# Patient Record
Sex: FEMALE | URBAN - METROPOLITAN AREA
[De-identification: names, ages, dates, MRNs, and addresses within clinical notes are randomized per-mention and may not be internally consistent; named-entity substitution may affect disease eponyms.]

---

## 2018-07-01 ENCOUNTER — OUTPATIENT (OUTPATIENT)
Dept: OUTPATIENT SERVICES | Facility: HOSPITAL | Age: 33
LOS: 1 days | End: 2018-07-01
Payer: MEDICAID

## 2018-07-21 ENCOUNTER — EMERGENCY (EMERGENCY)
Facility: HOSPITAL | Age: 33
LOS: 1 days | Discharge: HOME | End: 2018-07-21
Attending: EMERGENCY MEDICINE
Payer: SELF-PAY

## 2018-07-21 VITALS
WEIGHT: 164.91 LBS | HEART RATE: 81 BPM | HEIGHT: 62 IN | SYSTOLIC BLOOD PRESSURE: 120 MMHG | OXYGEN SATURATION: 100 % | DIASTOLIC BLOOD PRESSURE: 78 MMHG | RESPIRATION RATE: 16 BRPM | TEMPERATURE: 98 F

## 2018-07-21 DIAGNOSIS — Z90.89 ACQUIRED ABSENCE OF OTHER ORGANS: Chronic | ICD-10-CM

## 2018-07-21 PROCEDURE — 99284 EMERGENCY DEPT VISIT MOD MDM: CPT

## 2018-07-21 PROCEDURE — 99283 EMERGENCY DEPT VISIT LOW MDM: CPT

## 2018-07-21 RX ORDER — METHOCARBAMOL 500 MG/1
2 TABLET, FILM COATED ORAL
Qty: 28 | Refills: 0 | OUTPATIENT
Start: 2018-07-21 | End: 2018-07-27
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
## 2018-07-26 DIAGNOSIS — Z71.89 OTHER SPECIFIED COUNSELING: ICD-10-CM

## 2018-09-01 PROCEDURE — G9001: CPT

## 2021-08-20 ENCOUNTER — OFFICE VISIT (OUTPATIENT)
Dept: URGENT CARE | Facility: MEDICAL CENTER | Age: 36
End: 2021-08-20
Payer: COMMERCIAL

## 2021-08-20 VITALS
TEMPERATURE: 99.8 F | SYSTOLIC BLOOD PRESSURE: 130 MMHG | OXYGEN SATURATION: 98 % | HEART RATE: 87 BPM | DIASTOLIC BLOOD PRESSURE: 90 MMHG | RESPIRATION RATE: 20 BRPM

## 2021-08-20 DIAGNOSIS — H57.11 ACUTE RIGHT EYE PAIN: Primary | ICD-10-CM

## 2021-08-20 DIAGNOSIS — S05.01XA ABRASION OF RIGHT CORNEA, INITIAL ENCOUNTER: ICD-10-CM

## 2021-08-20 PROCEDURE — 99203 OFFICE O/P NEW LOW 30 MIN: CPT | Performed by: PHYSICIAN ASSISTANT

## 2021-08-20 RX ORDER — GENTAMICIN SULFATE 3 MG/ML
1 SOLUTION/ DROPS OPHTHALMIC 4 TIMES DAILY
Qty: 1.4 ML | Refills: 0 | Status: SHIPPED | OUTPATIENT
Start: 2021-08-20 | End: 2021-08-27

## 2021-08-20 RX ORDER — TETRACAINE HYDROCHLORIDE 5 MG/ML
1 SOLUTION OPHTHALMIC ONCE
Status: COMPLETED | OUTPATIENT
Start: 2021-08-20 | End: 2021-08-20

## 2021-08-20 RX ADMIN — TETRACAINE HYDROCHLORIDE 1 DROP: 5 SOLUTION OPHTHALMIC at 20:02

## 2021-08-20 NOTE — PROGRESS NOTES
[facility]        NAME: Jessica Lowery is a 29 y o  female  : 1985    MRN: [id number]  DATE: 2021  TIME: 7:56 PM    Assessment and Plan   Acute right eye pain [H57 11]  1  Acute right eye pain  tetracaine 0 5 % ophthalmic solution 1 drop    fluorescein sodium sterile 1 mg ophthalmic strip 1 strip   2  Abrasion of right cornea, initial encounter       Patient was educated on checking for right corneal abrasion  Patient consented to procedure  Patients right eye was numbed with one drop of tetracaine  Right eye was then stained and with UV light and corneal abrasion was identified on right eye  Patient understood  Patient tolerated procedure well without any complication  Patient Instructions       Patient was educated on right corneal abrasion  Patient was educated on antibiotics drops prescribed  Patient was told if redness or pressure in right eye persist follow up with eye doctor  Patient understood  Chief Complaint     Chief Complaint   Patient presents with   [facility] Problem     Patient presents with r eye redness, blurred vision, and pressure in her R eye when she closes it  She has been having this issue since the  intermittently  R eye is red          History of Present Illness       Patient is here today complaining of right eye pain that started 2021  Admits blurred vision and pressure in right eye  No history of right eye trauma  No itchness and or discharge from right eye  Review of Systems   Review of Systems   Constitutional: Negative  Eyes: Positive for pain and redness  Negative for discharge and itching  Respiratory: Negative  Cardiovascular: Negative  Psychiatric/Behavioral: Negative  Current Medications     No current outpatient medications on file      Current Facility-Administered Medications:     fluorescein sodium sterile 1 mg ophthalmic strip 1 strip, 1 strip, Right Eye, Once, Corwin Choi PA-C   tetracaine 0 5 % ophthalmic solution 1 drop, 1 drop, Right Eye, Once, Corwin Choi PA-C    Current Allergies     Allergies as of 08/20/2021    (No Known Allergies)            The following portions of the patient's history were reviewed and updated as appropriate: allergies, current medications, past family history, past medical history, past social history, past surgical history and problem list      History reviewed  No pertinent past medical history  History reviewed  No pertinent surgical history  History reviewed  No pertinent family history  Medications have been verified  Objective   /90   Pulse 87   Temp 99 8 °F (37 7 °C)   Resp 20   LMP  (LMP Unknown)   SpO2 98%   No LMP recorded (lmp unknown)  Physical Exam     Physical Exam  Constitutional:       Appearance: She is normal weight  HENT:      Head: Normocephalic  Eyes:      General:         Right eye: No discharge  Left eye: No discharge  Extraocular Movements: Extraocular movements intact  Pupils: Pupils are equal, round, and reactive to light  Comments: Right eye is injected  Cardiovascular:      Rate and Rhythm: Normal rate and regular rhythm  Heart sounds: Normal heart sounds  Pulmonary:      Breath sounds: Normal breath sounds  Neurological:      General: No focal deficit present  Mental Status: She is alert and oriented to person, place, and time     Psychiatric:         Behavior: Behavior normal

## 2021-08-20 NOTE — PATIENT INSTRUCTIONS
Patient was educated on right corneal abrasion  Patient was educated on antibiotics drops prescribed  Patient was told if redness or pressure in right eye persist follow up with eye doctor  Patient understood  Corneal Abrasion   WHAT YOU NEED TO KNOW:   A corneal abrasion is a scratch on the cornea of your eye  The cornea is the clear layer that covers the front of your eye  A small scratch may heal in 1 to 2 days  Deeper or larger scratches may take longer to heal       DISCHARGE INSTRUCTIONS:   Call your healthcare provider or ophthalmologist if:   · Your eye pain or vision gets worse  · You have yellow or green drainage from your eye  · You have questions or concerns about your condition or care  Medicines:   · Medicines  may be given in the form of eyedrops or ointment to help prevent an eye infection  You may also be given eyedrops to decrease pain  · Take your medicine as directed  Contact your healthcare provider if you think your medicine is not helping or if you have side effects  Tell him or her if you are allergic to any medicine  Keep a list of the medicines, vitamins, and herbs you take  Include the amounts, and when and why you take them  Bring the list or the pill bottles to follow-up visits  Carry your medicine list with you in case of an emergency  Care for your eyes:   · Get regular eye exams  Get your eyes checked at least every year  · Eat healthy foods  Fresh fruits and vegetables that are rich in vitamins A and C may help with your vision  Foods such as sweet potatoes, apricots, and carrots are rich in good nutrients for the eyes  · Take care of your contacts or glasses  Store, clean, and use your contacts or glasses as directed  Replace your glasses or contact lenses as often as your healthcare provider suggests  · Decrease eye strain  Rest your eyes, especially after you read or sew for long periods of time  Get plenty of sleep at night   Use lights that reduce glare in your home, school, or workplace  · Wear dark sunglasses  This will help prevent pain and light sensitivity  Make sure the sunglasses have UVA and UVB protection  This will protect your eyes when you go outside  · Use eyedrops safely  If your treatment plan includes eyedrops, it is important to use them as directed  Your provider may give you detailed instructions to follow  The eyedrops may also come with safety instructions  Follow all instructions to help prevent an infection  Do not touch the tip of the bottle to your eye  Germs from your eye can spread to the medicine bottle  Help prevent corneal abrasions:   · Remove your contact lenses if your eyes feel dry or irritated  · Wash your hands if you need to touch your eyes or your face  · Trim your child's fingernails so he or she cannot scratch his or her eye  · Wear protective eyewear when you work with chemicals, wood, dust, or metal     · Wear protective eyewear when you play sports  · Do not wear your contacts for longer than you should  · Do not wear colored lenses or lenses with shapes on them  These lenses may cause eye damage and vision loss  · Do not wear glitter makeup  Glitter can easily get into your eyes and under contact lenses  · Do not sleep with your contacts in  Follow up with your healthcare provider as directed:  Write down your questions so you remember to ask them during your visits  © Copyright [employer] 2021 Information is for End User's use only and may not be sold, redistributed or otherwise used for commercial purposes  All illustrations and images included in CareNotes® are the copyrighted property of A D A M , Inc  or [facility] Mika Hardy   The above information is an  only  It is not intended as medical advice for individual conditions or treatments   Talk to your doctor, nurse or pharmacist before following any medical regimen to see if it is safe and effective for you   Eye Pain   WHAT YOU NEED TO KNOW:   Eye pain may be caused by a problem within your eye  A problem or condition in another body area can also cause pain that travels to your eye  Some causes of eye pain include dry eyes, inflammation, a sinus infection, or a cluster headache  DISCHARGE INSTRUCTIONS:   Medicines: You may need any of the following:  · Artificial tears  are eyedrops that can help moisturize your eyes and relieve your pain  Ask your healthcare provider how often to use artificial tears  · NSAIDs , such as ibuprofen, help decrease swelling, pain, and fever  This medicine is available with or without a doctor's order  NSAIDs can cause stomach bleeding or kidney problems in certain people  If you take blood thinner medicine, always ask if NSAIDs are safe for you  Always read the medicine label and follow directions  Do not give these medicines to children under 10months of age without direction from your child's healthcare provider  · Take your medicine as directed  Contact your healthcare provider if you think your medicine is not helping or if you have side effects  Tell him of her if you are allergic to any medicine  Keep a list of the medicines, vitamins, and herbs you take  Include the amounts, and when and why you take them  Bring the list or the pill bottles to follow-up visits  Carry your medicine list with you in case of an emergency  Follow up with your healthcare provider as directed: You may be referred to an eye specialist  Write down your questions so you remember to ask them during your visits  Contact your healthcare provider if:   · You have a fever  · Your eye pain gets worse when you move your eyes  · You have questions or concerns about your condition or care  Return to the emergency department if:   · You have any vision loss  · You have sudden vision changes such as blurred vision, double vision, or seeing halos around lights      · You develop severe eye pain  © Copyright [employer] 2021 Information is for End User's use only and may not be sold, redistributed or otherwise used for commercial purposes  All illustrations and images included in CareNotes® are the copyrighted property of A D A M , Inc  or Adriano Norton  The above information is an  only  It is not intended as medical advice for individual conditions or treatments  Talk to your doctor, nurse or pharmacist before following any medical regimen to see if it is safe and effective for you

## 2021-10-22 ENCOUNTER — OFFICE VISIT (OUTPATIENT)
Dept: FAMILY MEDICINE CLINIC | Facility: CLINIC | Age: 36
End: 2021-10-22
Payer: COMMERCIAL

## 2021-10-22 VITALS
WEIGHT: 196 LBS | HEART RATE: 98 BPM | DIASTOLIC BLOOD PRESSURE: 80 MMHG | SYSTOLIC BLOOD PRESSURE: 130 MMHG | TEMPERATURE: 97.8 F | OXYGEN SATURATION: 99 % | BODY MASS INDEX: 36.07 KG/M2 | RESPIRATION RATE: 18 BRPM | HEIGHT: 62 IN

## 2021-10-22 DIAGNOSIS — E66.9 OBESITY (BMI 30-39.9): ICD-10-CM

## 2021-10-22 DIAGNOSIS — E78.2 MIXED HYPERLIPIDEMIA: ICD-10-CM

## 2021-10-22 DIAGNOSIS — R53.83 OTHER FATIGUE: ICD-10-CM

## 2021-10-22 DIAGNOSIS — Z00.00 ANNUAL PHYSICAL EXAM: Primary | ICD-10-CM

## 2021-10-22 DIAGNOSIS — Z11.59 ENCOUNTER FOR HEPATITIS C SCREENING TEST FOR LOW RISK PATIENT: ICD-10-CM

## 2021-10-22 DIAGNOSIS — Z11.4 ENCOUNTER FOR SCREENING FOR HIV: ICD-10-CM

## 2021-10-22 DIAGNOSIS — Z12.4 SCREENING FOR CERVICAL CANCER: ICD-10-CM

## 2021-10-22 DIAGNOSIS — R73.9 HYPERGLYCEMIA: ICD-10-CM

## 2021-10-22 DIAGNOSIS — E55.9 VITAMIN D DEFICIENCY: ICD-10-CM

## 2021-10-22 PROCEDURE — 99385 PREV VISIT NEW AGE 18-39: CPT | Performed by: NURSE PRACTITIONER

## 2021-10-22 PROCEDURE — 3725F SCREEN DEPRESSION PERFORMED: CPT | Performed by: NURSE PRACTITIONER

## 2021-10-22 PROCEDURE — 3008F BODY MASS INDEX DOCD: CPT | Performed by: NURSE PRACTITIONER

## 2021-10-29 ENCOUNTER — APPOINTMENT (OUTPATIENT)
Dept: LAB | Facility: HOSPITAL | Age: 36
End: 2021-10-29
Payer: COMMERCIAL

## 2021-10-29 DIAGNOSIS — E55.9 VITAMIN D DEFICIENCY: ICD-10-CM

## 2021-10-29 DIAGNOSIS — R73.9 HYPERGLYCEMIA: ICD-10-CM

## 2021-10-29 DIAGNOSIS — R53.83 OTHER FATIGUE: ICD-10-CM

## 2021-10-29 DIAGNOSIS — Z11.59 ENCOUNTER FOR HEPATITIS C SCREENING TEST FOR LOW RISK PATIENT: ICD-10-CM

## 2021-10-29 DIAGNOSIS — Z11.4 ENCOUNTER FOR SCREENING FOR HIV: ICD-10-CM

## 2021-10-29 DIAGNOSIS — E78.2 MIXED HYPERLIPIDEMIA: ICD-10-CM

## 2021-10-29 LAB
25(OH)D3 SERPL-MCNC: 22.5 NG/ML (ref 30–100)
ALBUMIN SERPL BCP-MCNC: 4.2 G/DL (ref 3–5.2)
ALP SERPL-CCNC: 95 U/L (ref 43–122)
ALT SERPL W P-5'-P-CCNC: 13 U/L
ANION GAP SERPL CALCULATED.3IONS-SCNC: 8 MMOL/L (ref 5–14)
AST SERPL W P-5'-P-CCNC: 28 U/L (ref 14–36)
BASOPHILS # BLD AUTO: 0 THOUSANDS/ΜL (ref 0–0.1)
BASOPHILS NFR BLD AUTO: 1 % (ref 0–1)
BILIRUB SERPL-MCNC: 0.45 MG/DL
BUN SERPL-MCNC: 10 MG/DL (ref 5–25)
CALCIUM SERPL-MCNC: 9.6 MG/DL (ref 8.4–10.2)
CHLORIDE SERPL-SCNC: 105 MMOL/L (ref 97–108)
CHOLEST SERPL-MCNC: 219 MG/DL
CO2 SERPL-SCNC: 26 MMOL/L (ref 22–30)
CREAT SERPL-MCNC: 0.62 MG/DL (ref 0.6–1.2)
EOSINOPHIL # BLD AUTO: 0.3 THOUSAND/ΜL (ref 0–0.4)
EOSINOPHIL NFR BLD AUTO: 6 % (ref 0–6)
ERYTHROCYTE [DISTWIDTH] IN BLOOD BY AUTOMATED COUNT: 14.7 %
GFR SERPL CREATININE-BSD FRML MDRD: 117 ML/MIN/1.73SQ M
GLUCOSE P FAST SERPL-MCNC: 94 MG/DL (ref 70–99)
HCT VFR BLD AUTO: 36.2 % (ref 36–46)
HCV AB SER QL: NORMAL
HDLC SERPL-MCNC: 41 MG/DL
HGB BLD-MCNC: 11.7 G/DL (ref 12–16)
LDLC SERPL CALC-MCNC: 147 MG/DL
LYMPHOCYTES # BLD AUTO: 2.4 THOUSANDS/ΜL (ref 0.5–4)
LYMPHOCYTES NFR BLD AUTO: 47 % (ref 25–45)
MCH RBC QN AUTO: 26.9 PG (ref 26–34)
MCHC RBC AUTO-ENTMCNC: 32.4 G/DL (ref 31–36)
MCV RBC AUTO: 83 FL (ref 80–100)
MONOCYTES # BLD AUTO: 0.4 THOUSAND/ΜL (ref 0.2–0.9)
MONOCYTES NFR BLD AUTO: 8 % (ref 1–10)
NEUTROPHILS # BLD AUTO: 1.9 THOUSANDS/ΜL (ref 1.8–7.8)
NEUTS SEG NFR BLD AUTO: 38 % (ref 45–65)
NONHDLC SERPL-MCNC: 178 MG/DL
PLATELET # BLD AUTO: 319 THOUSANDS/UL (ref 150–450)
PMV BLD AUTO: 9.7 FL (ref 8.9–12.7)
POTASSIUM SERPL-SCNC: 3.7 MMOL/L (ref 3.6–5)
PROT SERPL-MCNC: 7.9 G/DL (ref 5.9–8.4)
RBC # BLD AUTO: 4.36 MILLION/UL (ref 4–5.2)
SODIUM SERPL-SCNC: 139 MMOL/L (ref 137–147)
TRIGL SERPL-MCNC: 157 MG/DL
TSH SERPL DL<=0.05 MIU/L-ACNC: 4.32 UIU/ML (ref 0.47–4.68)
WBC # BLD AUTO: 5.1 THOUSAND/UL (ref 4.5–11)

## 2021-10-29 PROCEDURE — 84443 ASSAY THYROID STIM HORMONE: CPT

## 2021-10-29 PROCEDURE — 80053 COMPREHEN METABOLIC PANEL: CPT

## 2021-10-29 PROCEDURE — 86803 HEPATITIS C AB TEST: CPT

## 2021-10-29 PROCEDURE — 82306 VITAMIN D 25 HYDROXY: CPT

## 2021-10-29 PROCEDURE — 80061 LIPID PANEL: CPT

## 2021-10-29 PROCEDURE — 85025 COMPLETE CBC W/AUTO DIFF WBC: CPT

## 2021-10-29 PROCEDURE — 36415 COLL VENOUS BLD VENIPUNCTURE: CPT

## 2021-10-29 PROCEDURE — 87389 HIV-1 AG W/HIV-1&-2 AB AG IA: CPT

## 2021-10-31 LAB — HIV 1+2 AB+HIV1 P24 AG SERPL QL IA: NORMAL

## 2021-11-01 ENCOUNTER — TELEPHONE (OUTPATIENT)
Dept: FAMILY MEDICINE CLINIC | Facility: CLINIC | Age: 36
End: 2021-11-01

## 2022-10-12 PROBLEM — Z12.4 SCREENING FOR CERVICAL CANCER: Status: RESOLVED | Noted: 2021-10-22 | Resolved: 2022-10-12

## 2022-10-28 PROBLEM — Z11.59 ENCOUNTER FOR HEPATITIS C SCREENING TEST FOR LOW RISK PATIENT: Status: RESOLVED | Noted: 2021-10-22 | Resolved: 2022-10-28

## 2022-10-28 PROBLEM — R73.9 HYPERGLYCEMIA: Status: RESOLVED | Noted: 2021-10-22 | Resolved: 2022-10-28

## 2022-10-28 PROBLEM — D64.9 ANEMIA: Status: ACTIVE | Noted: 2022-10-28

## 2022-10-28 PROBLEM — Z11.4 ENCOUNTER FOR SCREENING FOR HIV: Status: RESOLVED | Noted: 2021-10-22 | Resolved: 2022-10-28

## 2023-01-27 ENCOUNTER — APPOINTMENT (EMERGENCY)
Dept: RADIOLOGY | Facility: HOSPITAL | Age: 38
End: 2023-01-27

## 2023-01-27 ENCOUNTER — HOSPITAL ENCOUNTER (EMERGENCY)
Facility: HOSPITAL | Age: 38
Discharge: HOME | End: 2023-01-27
Attending: EMERGENCY MEDICINE

## 2023-01-27 VITALS
WEIGHT: 185 LBS | BODY MASS INDEX: 34.04 KG/M2 | OXYGEN SATURATION: 100 % | HEART RATE: 88 BPM | RESPIRATION RATE: 16 BRPM | DIASTOLIC BLOOD PRESSURE: 108 MMHG | TEMPERATURE: 98 F | SYSTOLIC BLOOD PRESSURE: 141 MMHG | HEIGHT: 62 IN

## 2023-01-27 DIAGNOSIS — S83.92XA LEFT KNEE SPRAIN: Primary | ICD-10-CM

## 2023-01-27 RX ORDER — NAPROXEN 500 MG/1
500 TABLET ORAL 2 TIMES DAILY PRN
Qty: 10 TABLET | Refills: 0 | Status: SHIPPED | OUTPATIENT
Start: 2023-01-27

## 2023-01-27 NOTE — ED PROVIDER NOTES
History  Chief Complaint   Patient presents with   • Knee Injury     Reports falling while iceskating yesterday causing knee to dislocate  Able to self reduce but now reports pain and swelling  Pt reports knee has dislocated multiple time since she was 17  This is a 72-year-old female patient who was a skating several days ago and had a left knee patellar dislocation which she relocated herself  She has had multiple patellar dislocations at the age of 13 and stopped following up     The difference tween this 1 and previous is now she states that her knee feels unstable  No numbness or tingling  No weakness she did walk in her own power  Offers no other symptoms taken of the over-the-counter  The symptoms of an x-ray of her knee to rule out fracture if negative will place a knee immobilizer crutches and orthopedics for further evaluation differential diagnosis clues not limited to recurrent patellar dislocation, sprain, strain, fracture          None       Past Medical History:   Diagnosis Date   • Allergic        Past Surgical History:   Procedure Laterality Date   • TONSILLECTOMY         Family History   Problem Relation Age of Onset   • Hypertension Father      I have reviewed and agree with the history as documented  E-Cigarette/Vaping   • E-Cigarette Use Never User      E-Cigarette/Vaping Substances     Social History     Tobacco Use   • Smoking status: Never   • Smokeless tobacco: Never   Vaping Use   • Vaping Use: Never used   Substance Use Topics   • Alcohol use: Yes   • Drug use: Never       Review of Systems   Constitutional: Negative for chills, diaphoresis, fatigue and fever  HENT: Negative for congestion, ear pain, nosebleeds and sore throat  Eyes: Negative for photophobia, pain, discharge and visual disturbance  Respiratory: Negative for cough, choking, chest tightness, shortness of breath and wheezing  Cardiovascular: Negative for chest pain and palpitations     Gastrointestinal: Negative for abdominal distention, abdominal pain, diarrhea and vomiting  Genitourinary: Negative for dysuria, flank pain, frequency and hematuria  Musculoskeletal: Positive for gait problem (left knee pain)  Negative for arthralgias, back pain and joint swelling  Skin: Negative for color change and rash  Neurological: Negative for dizziness, seizures, syncope and headaches  Psychiatric/Behavioral: Negative for behavioral problems and confusion  The patient is not nervous/anxious  All other systems reviewed and are negative  Physical Exam  Physical Exam  Vitals and nursing note reviewed  Constitutional:       General: She is not in acute distress  Appearance: Normal appearance  She is not ill-appearing, toxic-appearing or diaphoretic  HENT:      Head: Normocephalic and atraumatic  Right Ear: Tympanic membrane, ear canal and external ear normal       Left Ear: Tympanic membrane, ear canal and external ear normal       Nose: Nose normal  No congestion or rhinorrhea  Mouth/Throat:      Mouth: Mucous membranes are dry  Pharynx: Oropharynx is clear  No oropharyngeal exudate or posterior oropharyngeal erythema  Eyes:      Extraocular Movements: Extraocular movements intact  Conjunctiva/sclera: Conjunctivae normal       Pupils: Pupils are equal, round, and reactive to light  Cardiovascular:      Rate and Rhythm: Normal rate and regular rhythm  Pulmonary:      Effort: Pulmonary effort is normal  No respiratory distress  Breath sounds: Normal breath sounds  Abdominal:      General: Bowel sounds are normal       Palpations: Abdomen is soft  Tenderness: There is no abdominal tenderness  Musculoskeletal:         General: Normal range of motion  Cervical back: Normal range of motion and neck supple  No rigidity or tenderness  Right lower leg: No edema  Left lower leg: No edema  Legs:    Lymphadenopathy:      Cervical: No cervical adenopathy  Skin:     General: Skin is warm and dry  Capillary Refill: Capillary refill takes less than 2 seconds  Findings: No rash  Neurological:      General: No focal deficit present  Mental Status: She is alert and oriented to person, place, and time  Mental status is at baseline  Psychiatric:         Mood and Affect: Mood normal          Behavior: Behavior normal          Vital Signs  ED Triage Vitals [01/27/23 0914]   Temperature Pulse Respirations Blood Pressure SpO2   98 °F (36 7 °C) 88 16 (!) 141/108 100 %      Temp Source Heart Rate Source Patient Position - Orthostatic VS BP Location FiO2 (%)   Oral Monitor Sitting Right arm --      Pain Score       7           Vitals:    01/27/23 0914   BP: (!) 141/108   Pulse: 88   Patient Position - Orthostatic VS: Sitting         Visual Acuity      ED Medications  Medications - No data to display    Diagnostic Studies  Results Reviewed     None                 XR knee 4+ views left injury    (Results Pending)              Procedures  Procedures         ED Course                               SBIRT 22yo+    Flowsheet Row Most Recent Value   SBIRT (25 yo +)    In order to provide better care to our patients, we are screening all of our patients for alcohol and drug use  Would it be okay to ask you these screening questions? No Filed at: 01/27/2023 0919                    MDM    Disposition  Final diagnoses:   Left knee sprain     Time reflects when diagnosis was documented in both MDM as applicable and the Disposition within this note     Time User Action Codes Description Comment    1/27/2023 10:26 AM Tad Morley Add [S83  92XA] Left knee sprain       ED Disposition     ED Disposition   Discharge    Condition   Stable    Date/Time   Fri Jan 27, 2023 10:26 AM    Duarte Kwon discharge to home/self care                 Follow-up Information     Follow up With Specialties Details Why Contact Info Additional Information    [facility] Schedule an appointment as soon as possible for a visit   [address] [facility]  [address] [facility] [id number]  [address] [place] [address], [address] [place] [address], [address], [place], [place], [id number] [phone]          Patient's Medications   Discharge Prescriptions    NAPROXEN (NAPROSYN) 500 MG TABLET    Take 1 tablet (500 mg total) by mouth 2 (two) times a day as needed for mild pain       Start Date: 1/27/2023 End Date: --       Order Dose: 500 mg       Quantity: 10 tablet    Refills: 0       No discharge procedures on file      PDMP Review     None          ED Provider  Electronically Signed by           Celina Castellano PA-C  01/27/23 0161

## 2023-01-27 NOTE — ED NOTES
Knee immobilizer applied and pt demonstrated proper use of crutches        Candice Johnson RN  01/27/23 6675

## 2023-01-27 NOTE — DISCHARGE INSTRUCTIONS
Follow-up with the orthopedic doctor listed    Return any worsening symptoms questions comments or concerns

## 2023-01-27 NOTE — Clinical Note
Guanaco Wyatt was seen and treated in our emergency department on 1/27/2023  Diagnosis:     Claudene Schwab    She may return on this date: 01/30/2023         If you have any questions or concerns, please don't hesitate to call        Omar Orozco PA-C    ______________________________           _______________          _______________  Hospital Representative                              Date                                Time

## 2023-02-01 ENCOUNTER — OFFICE VISIT (OUTPATIENT)
Dept: OBGYN CLINIC | Facility: MEDICAL CENTER | Age: 38
End: 2023-02-01

## 2023-02-01 VITALS
WEIGHT: 183 LBS | DIASTOLIC BLOOD PRESSURE: 86 MMHG | HEART RATE: 71 BPM | BODY MASS INDEX: 33.68 KG/M2 | SYSTOLIC BLOOD PRESSURE: 123 MMHG | HEIGHT: 62 IN

## 2023-02-01 DIAGNOSIS — M22.02 RECURRENT DISLOCATION OF LEFT PATELLA: Primary | ICD-10-CM

## 2023-02-01 DIAGNOSIS — M25.362 PATELLAR INSTABILITY OF LEFT KNEE: ICD-10-CM

## 2023-02-01 NOTE — PROGRESS NOTES
Ortho Sports Medicine Knee New Patient Visit     Assesment:   41 y o  female left knee recurrent patellar dislocations     Plan:    Conservative treatment:    Ice to knee for 20 minutes at least 1-2 times daily  OTC NSAIDS prn for pain  Fitted for J-Brace, which she will wear at all times until MRI is back    Imaging: All imaging from today was reviewed by myself and explained to the patient  We will obtain an MRI of the knee to rule out cartilage defect of the patella and or lateral femoral condyle  Follow up in 1-2 weeks for MRI review  Will make a definitive treatment plan based on the results of the MRI  Injection:    No Injection planned at this time  Surgery:     No surgery is recommended at this point, continue with conservative treatment plan as noted  Follow up:    No follow-ups on file  Chief Complaint   Patient presents with   • Left Knee - Pain     Ice skating, knee popped out  History of Present Illness: The patient is a 41 y o  female whose occupation is unknown, referred to me by the emergency room, seen in clinic for consultation of left knee pain  Pain is located anterior  The patient rates the pain as a 5/10  The pain has been present for 5 days  The patient sustained an injury on 1/27/23  She dislocated the patella and felt it clunk back into place  She has dislocated her knee once a year since the age of 13, injured it climbing up a jungle gym and her knee struck a metal and the knee dislocated  She has never required a formal reduction in the ED, and has only presented to them after injuries with negative xrays  Never had MRI  Pain is improved by rest   Pain is aggravated by stairs and squatting  Symptoms include catching, swelling and instability  The patient has tried rest, ice and NSAIDS            Knee Surgical History:  None    Past Medical, Social and Family History:  Past Medical History:   Diagnosis Date   • Allergic Past Surgical History:   Procedure Laterality Date   • TONSILLECTOMY       No Known Allergies  Current Outpatient Medications on File Prior to Visit   Medication Sig Dispense Refill   • naproxen (Naprosyn) 500 mg tablet Take 1 tablet (500 mg total) by mouth 2 (two) times a day as needed for mild pain 10 tablet 0     No current facility-administered medications on file prior to visit  Social History     Socioeconomic History   • Marital status: /Civil Union     Spouse name: Not on file   • Number of children: Not on file   • Years of education: Not on file   • Highest education level: Not on file   Occupational History   • Not on file   Tobacco Use   • Smoking status: Never   • Smokeless tobacco: Never   Vaping Use   • Vaping Use: Never used   Substance and Sexual Activity   • Alcohol use: Yes   • Drug use: Never   • Sexual activity: Yes     Partners: Male     Birth control/protection: None   Other Topics Concern   • Not on file   Social History Narrative   • Not on file     Social Determinants of Health     Financial Resource Strain: Not on file   Food Insecurity: Not on file   Transportation Needs: Not on file   Physical Activity: Not on file   Stress: Not on file   Social Connections: Not on file   Intimate Partner Violence: Not on file   Housing Stability: Not on file         I have reviewed the past medical, surgical, social and family history, medications and allergies as documented in the EMR  Review of systems: ROS is negative other than that noted in the HPI  Constitutional: Negative for fatigue and fever  HENT: Negative for sore throat  Respiratory: Negative for shortness of breath  Cardiovascular: Negative for chest pain  Gastrointestinal: Negative for abdominal pain  Endocrine: Negative for cold intolerance and heat intolerance  Genitourinary: Negative for flank pain  Musculoskeletal: Negative for back pain  Skin: Negative for rash     Allergic/Immunologic: Negative for immunocompromised state  Neurological: Negative for dizziness  Psychiatric/Behavioral: Negative for agitation  Physical Exam:    Height 5' 2" (1 575 m), weight 83 kg (183 lb), last menstrual period 01/15/2023  General/Constitutional: NAD, well developed, well nourished  HENT: Normocephalic, atraumatic  CV: Intact distal pulses, regular rate  Resp: No respiratory distress or labored breathing  GI: Soft and non-tender   Lymphatic: No lymphadenopathy palpated  Neuro: Alert and Oriented x 3, no focal deficits  Psych: Normal mood, normal affect, normal judgement, normal behavior  Skin: Warm, dry, no rashes, no erythema      Knee Exam (focused): RIGHT LEFT   ROM:   0-130 3-90   Palpation: Effusion negative moderate     MJL tenderness Negative Positive     LJL tenderness Negative Negative   Meniscus: Sabino Negative Negative    Apley's Compression Negative Negative   Instability: Varus stable stable     Valgus stable stable   Special Tests: Lachman Negative Negative     Posterior drawer Negative Negative     Anterior drawer Negative Negative     Pivot shift not tested not tested     Dial not tested not tested   Patella: Palpation no tenderness medial facet ttp and medial epicondyle     Mobility 1/2 3/4     Apprehension Negative Positive   Other: Single leg 1/4 squat not tested not tested      LE NV Exam: +2 DP/PT pulses bilaterally  Sensation intact to light touch L2-S1 bilaterally     Bilateral hip ROM demonstrates no pain actively or passively    No calf tenderness to palpation bilaterally    Knee Imaging    X-rays of the left knee were reviewed, which demonstrate no acute fracture or osseous abnormality  I have reviewed the radiology report and agree with their impression

## 2023-02-05 ENCOUNTER — HOSPITAL ENCOUNTER (OUTPATIENT)
Dept: MRI IMAGING | Facility: HOSPITAL | Age: 38
Discharge: HOME | End: 2023-02-05

## 2023-02-05 DIAGNOSIS — M22.02 RECURRENT DISLOCATION OF LEFT PATELLA: ICD-10-CM

## 2023-02-05 DIAGNOSIS — M25.362 PATELLAR INSTABILITY OF LEFT KNEE: ICD-10-CM

## 2023-02-13 ENCOUNTER — OFFICE VISIT (OUTPATIENT)
Dept: OBGYN CLINIC | Facility: MEDICAL CENTER | Age: 38
End: 2023-02-13

## 2023-02-13 VITALS
DIASTOLIC BLOOD PRESSURE: 86 MMHG | HEIGHT: 62 IN | BODY MASS INDEX: 33.68 KG/M2 | HEART RATE: 77 BPM | SYSTOLIC BLOOD PRESSURE: 120 MMHG | WEIGHT: 183 LBS

## 2023-02-13 DIAGNOSIS — S83.004D PATELLAR DISLOCATION, RIGHT, SUBSEQUENT ENCOUNTER: Primary | ICD-10-CM

## 2023-02-13 NOTE — PROGRESS NOTES
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     Plan:    - MRI of left knee has been reviewed with patient, demonstrating partial MPFL tear and bone contusion in lateral femoral condyle as well as medial aspect of patella  - Patient will undergo MPFL reconstruction of left knee with lateral soft tissue release  - Hinged Knee Brace was provided to patient at today's visit  - Patient will schedule surgery and we will see her next on date of surgery  - BMP and CBC has been ordered for preoperative screening    Conservative treatment:    Ice to knee for 20 minutes at least 1-2 times daily  OTC NSAIDS prn for pain  Let pain guide gradual return activities  Imaging: All imaging from today was reviewed by myself and explained to the patient  Injection:    No Injection planned at this time  Surgery: All of the risks and benefits of operative treatment were explained to the patient, as well as the risks and benefits of any alternative treatment options, including nonoperative care  The risks of surgical treatement include, but are not limited to, infection, bleeding, blood clot, neurovascular damage, need for further surgery, continued pain, cardiovascular risk, and anesthesia risk  The patient understood this and elects to proceed forward with surgical intervention  Patient will undergo MPFL reconstruction and lateral soft tissue release of her left knee  Follow up:    No follow-ups on file  Chief Complaint   Patient presents with   • Left Knee - Follow-up     MRI results/ some pain when going down steps and at night  History of Present Illness: The patient is returns for follow up of left knee patellar instability  Since the prior visit, She reports significant improvement  Pain is located medial      Pain is improved by rest, ice, NSAIDS and bracing    Pain is aggravated by stairs and weight bearing  Symptoms include swelling  The patient has tried rest, ice, NSAIDS and bracing  Knee Surgical History:  None    Past Medical, Social and Family History:  Past Medical History:   Diagnosis Date   • Allergic      Past Surgical History:   Procedure Laterality Date   • TONSILLECTOMY       No Known Allergies  Current Outpatient Medications on File Prior to Visit   Medication Sig Dispense Refill   • naproxen (Naprosyn) 500 mg tablet Take 1 tablet (500 mg total) by mouth 2 (two) times a day as needed for mild pain 10 tablet 0     No current facility-administered medications on file prior to visit  Social History     Socioeconomic History   • Marital status: /Civil Union     Spouse name: Not on file   • Number of children: Not on file   • Years of education: Not on file   • Highest education level: Not on file   Occupational History   • Not on file   Tobacco Use   • Smoking status: Never   • Smokeless tobacco: Never   Vaping Use   • Vaping Use: Never used   Substance and Sexual Activity   • Alcohol use: Yes   • Drug use: Never   • Sexual activity: Yes     Partners: Male     Birth control/protection: None   Other Topics Concern   • Not on file   Social History Narrative   • Not on file     Social Determinants of Health     Financial Resource Strain: Not on file   Food Insecurity: Not on file   Transportation Needs: Not on file   Physical Activity: Not on file   Stress: Not on file   Social Connections: Not on file   Intimate Partner Violence: Not on file   Housing Stability: Not on file         I have reviewed the past medical, surgical, social and family history, medications and allergies as documented in the EMR  Review of systems: ROS is negative other than that noted in the HPI  Constitutional: Negative for fatigue and fever        Physical Exam:    Blood pressure 120/86, pulse 77, height 5' 2" (1 575 m), weight 83 kg (183 lb), last menstrual period 01/15/2023  General/Constitutional: NAD, well developed, well nourished  HENT: Normocephalic, atraumatic  CV: Intact distal pulses, regular rate  Resp: No respiratory distress or labored breathing  GI: Soft and non-tender   Lymphatic: No lymphadenopathy palpated  Neuro: Alert and Oriented x 3, no focal deficits  Psych: Normal mood, normal affect, normal judgement, normal behavior  Skin: Warm, dry, no rashes, no erythema      Knee Exam (focused): RIGHT LEFT   ROM:   0-130 0-130   Palpation: Effusion negative mild     MJL tenderness Negative Positive     LJL tenderness Negative Negative   Meniscus: Sabino Negative Negative    Apley's Compression Negative Negative   Instability: Varus stable stable     Valgus stable stable   Special Tests: Lachman Negative Negative     Posterior drawer Negative Negative     Anterior drawer Negative Negative     Pivot shift not tested not tested     Dial not tested not tested   Patella: Palpation no tenderness no tenderness     Mobility 1/4 3/4     Apprehension Negative Positive   Other: Single leg 1/4 squat not tested not tested           LE NV Exam: +2 DP/PT pulses bilaterally  Sensation intact to light touch L2-S1 bilaterally    No calf tenderness to palpation bilaterally      Knee Imaging    MRI of the left knee was reviewed with the patient, which demonstrated bone contusion in the anterior lateral femoral condyle, and mild impaction fracture of the inferior medial patellar facet consistent with recent lateral patellar dislocation  Partial tear of MPFL is also seen         Scribe Attestation    I,:   am acting as a scribe while in the presence of the attending physician :       I,:   personally performed the services described in this documentation    as scribed in my presence :

## 2023-02-24 RX ORDER — CHLORHEXIDINE GLUCONATE 0.12 MG/ML
15 RINSE ORAL ONCE
OUTPATIENT
Start: 2023-02-24 | End: 2023-02-24

## 2023-02-24 RX ORDER — CEFAZOLIN SODIUM 2 G/50ML
2000 SOLUTION INTRAVENOUS ONCE
OUTPATIENT
Start: 2023-02-24 | End: 2023-02-24

## 2023-06-17 ENCOUNTER — APPOINTMENT (OUTPATIENT)
Dept: LAB | Facility: HOSPITAL | Age: 38
End: 2023-06-17
Payer: COMMERCIAL

## 2023-06-17 DIAGNOSIS — S83.004D PATELLAR DISLOCATION, RIGHT, SUBSEQUENT ENCOUNTER: ICD-10-CM

## 2023-06-17 LAB
ANION GAP SERPL CALCULATED.3IONS-SCNC: 8 MMOL/L (ref 4–13)
BUN SERPL-MCNC: 8 MG/DL (ref 5–25)
CALCIUM SERPL-MCNC: 9.8 MG/DL (ref 8.4–10.2)
CHLORIDE SERPL-SCNC: 103 MMOL/L (ref 96–108)
CO2 SERPL-SCNC: 27 MMOL/L (ref 21–32)
CREAT SERPL-MCNC: 0.69 MG/DL (ref 0.6–1.3)
ERYTHROCYTE [DISTWIDTH] IN BLOOD BY AUTOMATED COUNT: 18.5 % (ref 11.6–15.1)
GFR SERPL CREATININE-BSD FRML MDRD: 111 ML/MIN/1.73SQ M
GLUCOSE P FAST SERPL-MCNC: 98 MG/DL (ref 65–99)
HCT VFR BLD AUTO: 36.5 % (ref 34.8–46.1)
HGB BLD-MCNC: 10.9 G/DL (ref 11.5–15.4)
MCH RBC QN AUTO: 22.9 PG (ref 26.8–34.3)
MCHC RBC AUTO-ENTMCNC: 29.9 G/DL (ref 31.4–37.4)
MCV RBC AUTO: 77 FL (ref 82–98)
PLATELET # BLD AUTO: 347 THOUSANDS/UL (ref 149–390)
PMV BLD AUTO: 11.2 FL (ref 8.9–12.7)
POTASSIUM SERPL-SCNC: 4.3 MMOL/L (ref 3.5–5.3)
RBC # BLD AUTO: 4.75 MILLION/UL (ref 3.81–5.12)
SODIUM SERPL-SCNC: 138 MMOL/L (ref 135–147)
WBC # BLD AUTO: 5.25 THOUSAND/UL (ref 4.31–10.16)

## 2023-06-17 PROCEDURE — 80048 BASIC METABOLIC PNL TOTAL CA: CPT

## 2023-06-17 PROCEDURE — 36415 COLL VENOUS BLD VENIPUNCTURE: CPT

## 2023-06-17 PROCEDURE — 85027 COMPLETE CBC AUTOMATED: CPT

## 2023-06-21 RX ORDER — MULTIVITAMIN
1 TABLET ORAL DAILY
COMMUNITY

## 2023-06-21 NOTE — PRE-PROCEDURE INSTRUCTIONS
Pre-Surgery Instructions:   Medication Instructions   • Acetaminophen (TYLENOL PO) Uses PRN- OK to take day of surgery   • Ferrous Gluconate (IRON 27 PO) Hold day of surgery  • Multiple Vitamin (multivitamin) tablet Stop 6/21  Do not take morning of surgery   • naproxen (Naprosyn) 500 mg tablet Stop taking 3 days prior to surgery  Medication instructions for day surgery reviewed  Please use only a sip of water to take your instructed medications  Avoid all over the counter vitamins, supplements and NSAIDS for one week prior to surgery per anesthesia guidelines  Tylenol is ok to take as needed  You will receive a call one business day prior to surgery with an arrival time and hospital directions  If your surgery is scheduled on a Monday, the hospital will be calling you on the Friday prior to your surgery  If you have not heard from anyone by 8pm, please call the hospital supervisor through the hospital  at [phone]  Vanessa Alt [phone])  Do not eat or drink anything after midnight the night before your surgery, including candy, mints, lifesavers, or chewing gum  Do not drink alcohol 24hrs before your surgery  Try not to smoke at least 24hrs before your surgery  Follow the pre surgery showering instructions as listed in the [facility] Surgical Experience Booklet” or otherwise provided by your surgeon's office  Do not shave the surgical area 24 hours before surgery  Do not apply any lotions, creams, including makeup, cologne, deodorant, or perfumes after showering on the day of your surgery  No contact lenses, eye make-up, or artificial eyelashes  Remove nail polish, including gel polish, and any artificial, gel, or acrylic nails if possible  Remove all jewelry including rings and body piercing jewelry  Wear causal clothing that is easy to take on and off  Consider your type of surgery  Keep any valuables, jewelry, piercings at home   Please bring any specially ordered equipment (sling, braces) if indicated  Arrange for a responsible person to drive you to and from the hospital on the day of your surgery  Visitor Guidelines discussed  Call the surgeon's office with any new illnesses, exposures, or additional questions prior to surgery  Please reference your [facility] Surgical Experience Booklet” for additional information to prepare for your upcoming surgery

## 2023-06-26 ENCOUNTER — ANESTHESIA EVENT (OUTPATIENT)
Dept: PERIOP | Facility: HOSPITAL | Age: 38
End: 2023-06-26
Payer: COMMERCIAL

## 2023-06-27 ENCOUNTER — HOSPITAL ENCOUNTER (OUTPATIENT)
Facility: HOSPITAL | Age: 38
Setting detail: OUTPATIENT SURGERY
Discharge: HOME | End: 2023-06-27
Attending: ORTHOPAEDIC SURGERY | Admitting: ORTHOPAEDIC SURGERY
Payer: COMMERCIAL

## 2023-06-27 ENCOUNTER — ANESTHESIA (OUTPATIENT)
Dept: PERIOP | Facility: HOSPITAL | Age: 38
End: 2023-06-27
Payer: COMMERCIAL

## 2023-06-27 VITALS
RESPIRATION RATE: 18 BRPM | SYSTOLIC BLOOD PRESSURE: 130 MMHG | TEMPERATURE: 96.3 F | DIASTOLIC BLOOD PRESSURE: 93 MMHG | HEIGHT: 62 IN | WEIGHT: 176 LBS | OXYGEN SATURATION: 96 % | BODY MASS INDEX: 32.39 KG/M2 | HEART RATE: 67 BPM

## 2023-06-27 DIAGNOSIS — Z98.890 S/P LEFT KNEE SURGERY: Primary | ICD-10-CM

## 2023-06-27 LAB
EXT PREGNANCY TEST URINE: NEGATIVE
EXT. CONTROL: NORMAL

## 2023-06-27 PROCEDURE — 81025 URINE PREGNANCY TEST: CPT | Performed by: ORTHOPAEDIC SURGERY

## 2023-06-27 PROCEDURE — C1713 ANCHOR/SCREW BN/BN,TIS/BN: HCPCS | Performed by: ORTHOPAEDIC SURGERY

## 2023-06-27 PROCEDURE — 27427 RECONSTRUCTION KNEE: CPT | Performed by: ORTHOPAEDIC SURGERY

## 2023-06-27 PROCEDURE — C1781 MESH (IMPLANTABLE): HCPCS | Performed by: ORTHOPAEDIC SURGERY

## 2023-06-27 PROCEDURE — 29873 ARTHRS KNEE SURG W/LAT RLS: CPT | Performed by: ORTHOPAEDIC SURGERY

## 2023-06-27 PROCEDURE — C9290 INJ, BUPIVACAINE LIPOSOME: HCPCS | Performed by: ANESTHESIOLOGY

## 2023-06-27 PROCEDURE — NC001 PR NO CHARGE: Performed by: ORTHOPAEDIC SURGERY

## 2023-06-27 DEVICE — TENDON GRAFT GRACILIS: Type: IMPLANTABLE DEVICE | Site: KNEE | Status: FUNCTIONAL

## 2023-06-27 DEVICE — BIO-COMP INTERFSCRW W/DISP SHTH
Type: IMPLANTABLE DEVICE | Site: KNEE | Status: FUNCTIONAL
Brand: ARTHREX®

## 2023-06-27 DEVICE — SUTR ANCH,BIO-COMP S-TAK
Type: IMPLANTABLE DEVICE | Site: KNEE | Status: FUNCTIONAL
Brand: ARTHREX®

## 2023-06-27 RX ORDER — FENTANYL CITRATE 50 UG/ML
INJECTION, SOLUTION INTRAMUSCULAR; INTRAVENOUS AS NEEDED
Status: DISCONTINUED | OUTPATIENT
Start: 2023-06-27 | End: 2023-06-27

## 2023-06-27 RX ORDER — OXYCODONE HYDROCHLORIDE 5 MG/1
5 TABLET ORAL EVERY 4 HOURS PRN
Status: DISCONTINUED | OUTPATIENT
Start: 2023-06-27 | End: 2023-06-27 | Stop reason: HOSPADM

## 2023-06-27 RX ORDER — BUPIVACAINE HYDROCHLORIDE 5 MG/ML
INJECTION, SOLUTION EPIDURAL; INTRACAUDAL AS NEEDED
Status: DISCONTINUED | OUTPATIENT
Start: 2023-06-27 | End: 2023-06-27

## 2023-06-27 RX ORDER — ONDANSETRON 2 MG/ML
INJECTION INTRAMUSCULAR; INTRAVENOUS AS NEEDED
Status: DISCONTINUED | OUTPATIENT
Start: 2023-06-27 | End: 2023-06-27

## 2023-06-27 RX ORDER — CHLORHEXIDINE GLUCONATE 0.12 MG/ML
15 RINSE ORAL ONCE
Status: DISCONTINUED | OUTPATIENT
Start: 2023-06-27 | End: 2023-06-27 | Stop reason: HOSPADM

## 2023-06-27 RX ORDER — MAGNESIUM HYDROXIDE 1200 MG/15ML
LIQUID ORAL AS NEEDED
Status: DISCONTINUED | OUTPATIENT
Start: 2023-06-27 | End: 2023-06-27 | Stop reason: HOSPADM

## 2023-06-27 RX ORDER — HYDROMORPHONE HCL IN WATER/PF 6 MG/30 ML
0.2 PATIENT CONTROLLED ANALGESIA SYRINGE INTRAVENOUS
Status: DISCONTINUED | OUTPATIENT
Start: 2023-06-27 | End: 2023-06-27 | Stop reason: HOSPADM

## 2023-06-27 RX ORDER — PROPOFOL 10 MG/ML
INJECTION, EMULSION INTRAVENOUS AS NEEDED
Status: DISCONTINUED | OUTPATIENT
Start: 2023-06-27 | End: 2023-06-27

## 2023-06-27 RX ORDER — PROMETHAZINE HYDROCHLORIDE 25 MG/ML
12.5 INJECTION, SOLUTION INTRAMUSCULAR; INTRAVENOUS ONCE AS NEEDED
Status: DISCONTINUED | OUTPATIENT
Start: 2023-06-27 | End: 2023-06-27 | Stop reason: HOSPADM

## 2023-06-27 RX ORDER — CEFAZOLIN SODIUM 2 G/50ML
2000 SOLUTION INTRAVENOUS ONCE
Status: COMPLETED | OUTPATIENT
Start: 2023-06-27 | End: 2023-06-27

## 2023-06-27 RX ORDER — OXYCODONE HYDROCHLORIDE 5 MG/1
5 TABLET ORAL EVERY 4 HOURS PRN
Qty: 15 TABLET | Refills: 0 | Status: SHIPPED | OUTPATIENT
Start: 2023-06-27

## 2023-06-27 RX ORDER — DIPHENHYDRAMINE HYDROCHLORIDE 50 MG/ML
12.5 INJECTION INTRAMUSCULAR; INTRAVENOUS ONCE AS NEEDED
Status: DISCONTINUED | OUTPATIENT
Start: 2023-06-27 | End: 2023-06-27 | Stop reason: HOSPADM

## 2023-06-27 RX ORDER — SODIUM CHLORIDE, SODIUM LACTATE, POTASSIUM CHLORIDE, CALCIUM CHLORIDE 600; 310; 30; 20 MG/100ML; MG/100ML; MG/100ML; MG/100ML
50 INJECTION, SOLUTION INTRAVENOUS CONTINUOUS
Status: DISCONTINUED | OUTPATIENT
Start: 2023-06-27 | End: 2023-06-27 | Stop reason: HOSPADM

## 2023-06-27 RX ORDER — MIDAZOLAM HYDROCHLORIDE 2 MG/2ML
INJECTION, SOLUTION INTRAMUSCULAR; INTRAVENOUS AS NEEDED
Status: DISCONTINUED | OUTPATIENT
Start: 2023-06-27 | End: 2023-06-27

## 2023-06-27 RX ORDER — DEXAMETHASONE SODIUM PHOSPHATE 10 MG/ML
INJECTION, SOLUTION INTRAMUSCULAR; INTRAVENOUS AS NEEDED
Status: DISCONTINUED | OUTPATIENT
Start: 2023-06-27 | End: 2023-06-27

## 2023-06-27 RX ORDER — FENTANYL CITRATE/PF 50 MCG/ML
25 SYRINGE (ML) INJECTION
Status: COMPLETED | OUTPATIENT
Start: 2023-06-27 | End: 2023-06-27

## 2023-06-27 RX ADMIN — PROPOFOL 100 MG: 10 INJECTION, EMULSION INTRAVENOUS at 10:37

## 2023-06-27 RX ADMIN — DEXAMETHASONE SODIUM PHOSPHATE 10 MG: 10 INJECTION, SOLUTION INTRAMUSCULAR; INTRAVENOUS at 10:49

## 2023-06-27 RX ADMIN — FENTANYL CITRATE 25 MCG: 50 INJECTION, SOLUTION INTRAMUSCULAR; INTRAVENOUS at 13:13

## 2023-06-27 RX ADMIN — FENTANYL CITRATE 50 MCG: 50 INJECTION, SOLUTION INTRAMUSCULAR; INTRAVENOUS at 12:17

## 2023-06-27 RX ADMIN — FENTANYL CITRATE 25 MCG: 50 INJECTION, SOLUTION INTRAMUSCULAR; INTRAVENOUS at 13:01

## 2023-06-27 RX ADMIN — BUPIVACAINE HYDROCHLORIDE 10 ML: 5 INJECTION, SOLUTION EPIDURAL; INTRACAUDAL; PERINEURAL at 08:59

## 2023-06-27 RX ADMIN — SODIUM CHLORIDE, SODIUM LACTATE, POTASSIUM CHLORIDE, AND CALCIUM CHLORIDE: .6; .31; .03; .02 INJECTION, SOLUTION INTRAVENOUS at 09:23

## 2023-06-27 RX ADMIN — FENTANYL CITRATE 25 MCG: 50 INJECTION, SOLUTION INTRAMUSCULAR; INTRAVENOUS at 12:57

## 2023-06-27 RX ADMIN — FENTANYL CITRATE 50 MCG: 50 INJECTION, SOLUTION INTRAMUSCULAR; INTRAVENOUS at 11:11

## 2023-06-27 RX ADMIN — FENTANYL CITRATE 50 MCG: 50 INJECTION, SOLUTION INTRAMUSCULAR; INTRAVENOUS at 08:56

## 2023-06-27 RX ADMIN — MIDAZOLAM 2 MG: 1 INJECTION INTRAMUSCULAR; INTRAVENOUS at 08:56

## 2023-06-27 RX ADMIN — BUPIVACAINE 20 ML: 13.3 INJECTION, SUSPENSION, LIPOSOMAL INFILTRATION at 08:59

## 2023-06-27 RX ADMIN — PROPOFOL 100 MG: 10 INJECTION, EMULSION INTRAVENOUS at 10:41

## 2023-06-27 RX ADMIN — CEFAZOLIN SODIUM 2000 MG: 2 SOLUTION INTRAVENOUS at 10:27

## 2023-06-27 RX ADMIN — FENTANYL CITRATE 25 MCG: 50 INJECTION, SOLUTION INTRAMUSCULAR; INTRAVENOUS at 13:07

## 2023-06-27 RX ADMIN — PROPOFOL 200 MG: 10 INJECTION, EMULSION INTRAVENOUS at 10:34

## 2023-06-27 RX ADMIN — FENTANYL CITRATE 25 MCG: 50 INJECTION, SOLUTION INTRAMUSCULAR; INTRAVENOUS at 10:34

## 2023-06-27 RX ADMIN — FENTANYL CITRATE 25 MCG: 50 INJECTION, SOLUTION INTRAMUSCULAR; INTRAVENOUS at 10:38

## 2023-06-27 RX ADMIN — ONDANSETRON 4 MG: 2 INJECTION INTRAMUSCULAR; INTRAVENOUS at 11:52

## 2023-06-27 NOTE — OP NOTE
OPERATIVE REPORT  PATIENT NAME: Quinton Ruiz    :  1985  MRN: [id number]  Pt Location:  OR ROOM 12    SURGERY DATE: 2023    Surgeon(s) and Role:     * Isidro Lopez DO - Primary     * Waldemar Aburto - Assisting     * Sona Pelaez MD - Assisting     * Milton Raygoza PA-C - Assisting    Preop Diagnosis:  Patellar dislocation, left    Procedure(s):  Left - ARTHROSCOPIC REALIGNMENT PATELLA  Left - ARTHROSCOPY KNEE    Specimen(s):  * No specimens in log *    Estimated Blood Loss:   Minimal    Drains:  * No LDAs found *    Anesthesia Type:   General    Operative Indications:  Patellar dislocation    Complications:   None    Procedure and Technique:      PREOPERATIVE DIAGNOSIS:  Left knee Patellar maltracking   Left knee Patellar Instability     POSTOPERATIVE DIAGNOSIS:  Left knee Patellar maltracking   Left knee Patellar Instability       PROCEDURE:  1  Left knee MPFL reconstruction with gracilis allograft   2  Surgical arthroscopy of the left knee with lateral release     ANESTHESIA:  General      ESTIMATED BLOOD LOSS:  minimal      COMPLICATIONS:  None  DRAINS:  None  TOURNIQUET:  30 minutes     OPERATIVE INDICATIONS:     Based on MRI and sxs referable to recurrent patella instability and maltracking, and having failed nonop measures, we recommended surgical intervention  We discussed the risks and benefits of surgery along with the probabilities of each with the patient  The risks include but are not limited to: infection (superficial or deep), bleeding, nerve damage (direct or indirect), post operative stiffness, blood clots, pulmonary embolus, death from anesthesia  They elect to proceed with MPFL reconstruction using hamstring allograft, as well as arthroscopy with lateral release     FINDINGS:  Examination under anesthesia revealed a range of motion in the left knee from about -5 to 135 degrees of flexion   The left knee had -5 degrees of hyperextension to 135 degrees of flexion  In the left knee, there was a Negative lachman and negative pivot shift  The knee was stable to valgus and varus stress  Negative posterior drawer  Negative posterolateral drawer  There was significant left patellar instability with translation of 3/4 distance of the patellar width  DESCRIPTION OF PROCEDURE:           Informed consent and initialing of the left knee in preoperative holding  General with LMA  anesthesia  Patient supine  Sterile prep and drape of the left lower extremity  Antibiotics given within 1 hour of skin incision  Time-out prior to the skin incision  Findings:      Arthroscopic evaluation of the knee revealed the following:   Medial meniscus: No tears  Medial femoral condyle:Grade 0 chondral defects  Medial tibial plateau: Grade 0 chondral defects  Anterior cruciate ligament: No tears    Posterior cruciate ligament: Normal appearance  Lateral meniscus: No tears  Lateral femoral condyle: Grade 0 chondral defects  Lateral tibial plateau: Grade 0 chondral defects  Medial and lateral gutters: No loose bodies  Patella: Grade 0 chondral defects  Trochlea: Grade 0 chondral defects  Medial plica: No significant plica was present         Procedure: In the pre-operative holding area, the patient identified the correct operative extremity and I marked that extremity with my initials, using a permanent marker  The patient was then brought to the operating room and positioned supine  Following satisfactory induction of anesthesia, the  knee was prepped and draped in the usual sterile fashion for surgical arthroscopy of the left knee  Before any surgical instrumentation was passed to me by the surgical technician, a formalized time-out occurred, which involves the surgeon, circulating nurse, and anesthesia staff all verifying the correct operative extremity  My initials were visible on the prepped and draped operative field        The anatomic landmarks of the anteromedial and anterolateral portals were marked  The anterolateral portal was established with a scalpel  The arthroscope was introduced through this portal  Under direct visualization, the anteromedial portal was established with a localizing needle followed by a scalpel  A probe was then introduced into the anteromedial portal  A systematic diagnostic arthroscopy evaluated the following: medial compartment, notch, lateral compartment, patellofemoral compartment, medial gutter, and lateral gutter  Patella lateral maltracking and lateral tilting were confirmed arthroscopically  Consequently, a release of the lateral retinaculum was performed using the electrocautery tool Subsequently, the patella tracked more centrally  Tilting resolved  At this time, the point at the junction between the proximal one third and the middle one third of the patella was identified and was verified fluoroscopically  This was to be the patellar origin of the MPFL graft  3cm incision made -- interval between layer 2 and 3 located  A small rongeur and a handheld bur was used to make a small trough approximately 3 mm in width and 2 cm long at the planned location of the patellar fixation of our MPFL graft  2×3 0mm Arthrex suturetak anchors were placed into this spot approximately 1-1/2 cm apart  A gracilis allograft 260 mm in length was doubled and the looped end was secured to this point on the patella using 1 limb of each anchor through the looped end of the graft, then tied together  The graft was drawn down into the trough made into the patella by pulling on the remaining limbs, passing those remaining limbs through the graft, and tying the remaining 2 limbs together on top resulting in 2 knots securing the gracilis graft  This provided a very secure fixation of the looped end of the gracilis allograft on the patella   The gracilis allograft was wrapped in saline and protected with a towel while attention was turned to the femoral side of the MPFL reconstruction  At this time, fluoroscopy was used to guide an incision based on the medial epicondyle of the femur  A 3 cm incision was made over this area and blunt dissection was taken down to the fascia  15 blade was used to cut the fascia longitudinally approximately 3 cm  Blunt dissection was taken down to the abductor tubercle and the medial epicondyle  The small valley intervening these 2 structures was identified  A Beath pin was placed provisionally in the spot and was verified fluoroscopically as being an adequate placement for the femoral attachment of the MPFL graft that we were to place  Once fluoroscopically confirmed, the Beath pin was inserted into the medial femoral condyle and out the lateral cortex and lateral skin at the anterior one third of the iliotibial band  Blunt retraction was used throughout this procedure to protect all surrounding structures  A blunt tonsil was used to deliver a suture shuttle from the patella down to this location  A loop was placed on the patella side and the 2 free ends of the gracilis allograft were delivered in the interval between layer 2 and 3 down into our medial thigh incision  Care was taken to tension the graft down to the pin while fluoroscopy was used to provide a perfect AP of the knee in 60° of flexion  This was done to ensure that we had the appropriate length going into the planned tunnel, that the patella was centrally tracking, and that we were not going to over constrain the patella  In addition, the free ends of the graft were looped around the Beath pin and the knee was taken through a full range of motion to ensure optimal graft isometry prior to reaming  Once the appropriate length was decided, the gracilis allograft free ends were whipstitched together from that point meeting the aperture of the tunnel to approximately 25mm past this point  Approximately 2 cm of extra tendon was removed  A 7 mm reamer was placed over this pin, and this was taken approximately 5 cm into the medial femoral condyle  The ends of the allograft were placed into the Beath pin eyelet and brought out laterally  Again, tension was carefully applied to ensure central tracking of the patella without overconstraining the patella  At this juncture, a 7 x 23 mm Arthrex Bio-Tenodesis screw was brought to the field and was then delivered into the tunnel along with the stitched end of the gracilis allograft  The screw was delivered smoothly into the tunnel that was created until flush with bone  The knee was taken through a full range of motion, and there was a good endpoint with lateral patellar displacement  The patella was tracking centrally throughout the range  Wounds were copiously irrigated using sterile saline  Meticulous hemostasis was obtained  Deep subcutaneous layer closed with 0-vicyl in an inverted interrupted fashion  This was followed by 2-0 vicryl interrupted for superficial subcutaneous tissue and 3-0 monocryl in a buried fashion     Arthroscopic portals were closed with 3-0 monocryl sutures  A sterile dressing was applied  A brace was applied  The patient tolerated the procedure well was taken to the recovery room stable condition  IMPLANTS USED:  2 x 3 0mm PEEK suturetak anchors  1 x 7x23 PEEK biotenodesis screw  1 x Gracilis allograft     POSTOPERATIVE PLAN:  The patient will be WBAT with brace locked in extension (about 6-8 weeks)  I was present for the entire procedure      Patient Disposition:  PACU         SIGNATURE: Stacy Whiting DO  DATE: June 27, 2023  TIME: 12:05 PM

## 2023-06-27 NOTE — ANESTHESIA POSTPROCEDURE EVALUATION
Post-Op Assessment Note    CV Status:  Stable  Pain Score: 0    Pain management: adequate     Mental Status:  Alert and awake   Hydration Status:  Euvolemic   PONV Controlled:  Controlled   Airway Patency:  Patent      Post Op Vitals Reviewed: Yes      Staff: CRNA         No notable events documented      /81 (06/27/23 1239)    Temp (!) 97 4 °F (36 3 °C) (06/27/23 1239)    Pulse 79 (06/27/23 1239)   Resp 16 (06/27/23 1239)    SpO2 93 % (06/27/23 1239)

## 2023-06-27 NOTE — PROGRESS NOTES
Left Adductor Canal Block Note:    Time out preformed  Laterality verified  Patient sedated on standard ASA monitors  Patient prepped in sterile fashion  Needle advanced under ultrasound guidance  30 mL of a mixture of 20 mL Exparel and 10 mL of 0 5% Bupivacaine injected after negative aspiration in 5 cc increments for adductor canal block  Image stored  No complications apparent  VSS

## 2023-06-27 NOTE — NURSING NOTE
Pt is awake,alert,tolerated diet, assisted OOB, voided  Written and verbal instructions given, pt verbalizes an understanding

## 2023-06-27 NOTE — ANESTHESIA PREPROCEDURE EVALUATION
Procedure:  ARTHROSCOPIC REALIGNMENT PATELLA (Left: Knee)  ARTHROSCOPY KNEE (Left: Knee)    Relevant Problems   CARDIO   (+) Mixed hyperlipidemia      HEMATOLOGY   (+) Anemia      Musculoskeletal and Integument   (+) Patellar dislocation, right, subsequent encounter      Other   (+) Obesity (BMI 30-39  9)        Plan for Left Adductor Canal Block with Exparel and 0 5% bupivicaine followed by general anesthesia with LMA  I explained risks and benefits of block  Patient and surgeon in agreement with the above plan  Latest Reference Range & Units 06/27/23 08:09   PREGNANCY TEST URINE Negative  Negative     Physical Exam    Airway    Mallampati score: II  TM Distance: >3 FB  Neck ROM: full     Dental       Cardiovascular      Pulmonary      Other Findings        Anesthesia Plan  ASA Score- 2     Anesthesia Type- general with ASA Monitors  Additional Monitors:   Airway Plan: LMA  Plan Factors-Exercise tolerance (METS): >4 METS  Chart reviewed  Existing labs reviewed  Patient summary reviewed  Patient is not a current smoker  Patient did not smoke on day of surgery  Induction- intravenous  Postoperative Plan- Plan for postoperative opioid use  Informed Consent- Anesthetic plan and risks discussed with patient  I personally reviewed this patient with the CRNA  Discussed and agreed on the Anesthesia Plan with the CRNA  Yumiko Ruvalcaba

## 2023-06-27 NOTE — DISCHARGE INSTR - AVS FIRST PAGE
POSTOPERATIVE INSTRUCTIONS following KNEE SURGERY    MEDICATIONS:  Resume all home medications unless otherwise instructed by your surgeon  Pain Medication:  Oxycodone 5 mg, 1 tablets every 4 hours as needed  If you were given a regional anesthetic (nerve block), please begin taking the pain medication as soon as you get home, even if you have minimal or no pain  DO NOT WAIT FOR THE NERVE BLOCK TO WEAR OFF  Possible side effects include nausea, constipation, and urinary retention  If you experience these side effects, please call our office for assistance  Pain med refills are authorized only during office hours (8am-4pm, Mon-Fri)  Anti-Inflammatory:  Ibuprofen 600 mg, 1 tablet every 8 hours for 4 weeks and Tylenol 325 mg, 1-2 tablets every 6 hours for 4 weeks  Take with food  Stop if you experience nausea, reflux, or stomach pain  Nausea Medication:  None  Fill prescription ONLY if you expericnce severe nausea  Blood Clot Prevention:  Aspirin 81 mg, 1 tablet twice daily for 30 days  Pump your foot up and down 20 times per hour while you are less mobile  WOUND CARE:  Keep the dressing clean and dry  Light drainage may occur the first 2 days postop  You may remove the dressings and get the incision wet in the shower 72 hours after surgery  DO NOT remove steri-strips or sutures  DO NOT immerse the incision under water  Carefully pat the incision dry  If there is wound drainage, re-apply a fresh dry gauze dressing  Please call our office ([phone]) if you experience either of the following:  Sudden increase in swelling, redness, or warmth at the surgical site  Excessive incisional drainage that persists beyond the 3rd day after surgery  Oral temperature greater than 101 degrees, not relieved with Tylenol  Shortness of breath, chest pain, nausea, or any other concerning symptoms    SWELLING CONTROL:  Cold Therapy:   The cold therapy device may be used either continuously or only as needed, "according to your preference  Do not let the pad directly touch your skin  Alternatively, apply ice (20 min on, 20 min off) as often as you feel is necessary  Elevation:  Elevate the entire leg above heart level  Place pillows under your ankle to keep your knee straight  Compression:  Apply ACE wraps or a thigh-length compression stocking as needed  RANGE OF MOTION:  You are allowed LIMITED RANGE OF MOTION from 0 degrees (full extension) to 90 degrees of flexion    IMMOBILIZATION:  Your knee brace should be WORN AT ALL TIMES, including sleep  Keep the brace LOCKED FOR WALKING  You may unlock the brace while sitting or sleeping  You may remove the brace only for showering  ACTIVITY:   BEAR FULL WEIGHT AS TOLERATED on the operative leg  Use crutches to assist only as needed  Using Crutches on Stairs:  Going up, lead with your \"good\" (nonoperative) leg  Going down, lead with your \"bad\" (operative) leg  Use a hand rail when available  Knee Extension:  Place a rolled towel or pillow under your ankle for 20-30 minutes 3-5 times per day  This will help to maintain full knee extension  Quad Sets:  Sit or lie with your knee straight  Tighten your quadriceps (front thigh) muscle  Hold for 3 seconds, then relax  Repeat 20 times per hour while awake  PHYSICAL THERAPY:  Begin therapy 3 TO 5 DAYS AFTER SURGERY  You were given a prescription for therapy at your preoperative office visit  If you do not have physical therapy scheduled yet, please call our office for assistance      FOLLOW-UP APPOINTMENT:  7-10 days after surgery with:    DO Kendrick Peña  [facility]ists  [address] [facility], [address] [place] [address], [facility], [address] [facility]  [phone] ([facility])  [phone] (After-Hours)   "

## 2023-06-27 NOTE — ANESTHESIA PROCEDURE NOTES
Peripheral Block    Patient location during procedure: holding area  Start time: 6/27/2023 8:59 AM  Reason for block: at surgeon's request and post-op pain management  Staffing  Performed by: Blanche King MD  Authorized by: Blanche King MD    Preanesthetic Checklist  Completed: patient identified, IV checked, site marked, risks and benefits discussed, surgical consent, monitors and equipment checked, pre-op evaluation and timeout performed  Peripheral Block  Patient position: supine  Prep: ChloraPrep  Patient monitoring: continuous pulse ox, frequent blood pressure checks, cardiac monitor and heart rate  Block type: adductor canal block  Laterality: left  Injection technique: single-shot  Procedures: ultrasound guided, Ultrasound guidance required for the procedure to increase accuracy and safety of medication placement and decrease risk of complications    Ultrasound permanent image saved  Needle  Needle type: Stimuplex   Needle gauge: 21 G  Needle length: 4 in  Needle localization: anatomical landmarks  Needle insertion depth: 3 cm  Test dose: negative  Assessment  Injection assessment: incremental injection, local visualized surrounding nerve on ultrasound, negative aspiration for heme and no paresthesia on injection  Paresthesia pain: none  Heart rate change: no  Slow fractionated injection: yes  Post-procedure:  site cleaned  patient tolerated the procedure well with no immediate complications

## 2023-06-27 NOTE — H&P
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     Plan:    - MRI of left knee has been reviewed with patient, demonstrating partial MPFL tear and bone contusion in lateral femoral condyle as well as medial aspect of patella  - Patient will undergo MPFL reconstruction of left knee with lateral soft tissue release  - Hinged Knee Brace was provided to patient at today's visit  - Patient will schedule surgery and we will see her next on date of surgery  - BMP and CBC has been ordered for preoperative screening    Conservative treatment:    Ice to knee for 20 minutes at least 1-2 times daily  OTC NSAIDS prn for pain  Let pain guide gradual return activities  Imaging: All imaging from today was reviewed by myself and explained to the patient  Injection:    No Injection planned at this time  Surgery: All of the risks and benefits of operative treatment were explained to the patient, as well as the risks and benefits of any alternative treatment options, including nonoperative care  The risks of surgical treatement include, but are not limited to, infection, bleeding, blood clot, neurovascular damage, need for further surgery, continued pain, cardiovascular risk, and anesthesia risk  The patient understood this and elects to proceed forward with surgical intervention  Patient will undergo MPFL reconstruction and lateral soft tissue release of her left knee  Follow up:    No follow-ups on file  No chief complaint on file  History of Present Illness: The patient is returns for follow up of left knee patellar instability  Since the prior visit, She reports significant improvement  Pain is located medial      Pain is improved by rest, ice, NSAIDS and bracing  Pain is aggravated by stairs and weight bearing  Symptoms include swelling       The patient has tried rest, ice, NSAIDS and bracing  Knee Surgical History:  None    Past Medical, Social and Family History:  Past Medical History:   Diagnosis Date   • Allergic    • Anemia    • Depression     No medications     Past Surgical History:   Procedure Laterality Date   • TONSILLECTOMY       No Known Allergies  No current facility-administered medications on file prior to encounter  Current Outpatient Medications on File Prior to Encounter   Medication Sig Dispense Refill   • Ferrous Gluconate (IRON 27 PO) Take by mouth     • Multiple Vitamin (multivitamin) tablet Take 1 tablet by mouth daily     • Acetaminophen (TYLENOL PO) Take 1,000 mg by mouth every 8 (eight) hours as needed     • naproxen (Naprosyn) 500 mg tablet Take 1 tablet (500 mg total) by mouth 2 (two) times a day as needed for mild pain 10 tablet 0     Social History     Socioeconomic History   • Marital status: /Civil Union     Spouse name: Not on file   • Number of children: Not on file   • Years of education: Not on file   • Highest education level: Not on file   Occupational History   • Not on file   Tobacco Use   • Smoking status: Never   • Smokeless tobacco: Never   Vaping Use   • Vaping Use: Never used   Substance and Sexual Activity   • Alcohol use: Yes     Comment: socially 3 per month   • Drug use: Never   • Sexual activity: Yes     Partners: Male     Birth control/protection: None   Other Topics Concern   • Not on file   Social History Narrative   • Not on file     Social Determinants of Health     Financial Resource Strain: Not on file   Food Insecurity: Not on file   Transportation Needs: Not on file   Physical Activity: Not on file   Stress: Not on file   Social Connections: Not on file   Intimate Partner Violence: Not on file   Housing Stability: Not on file         I have reviewed the past medical, surgical, social and family history, medications and allergies as documented in the EMR      Review of systems: ROS is negative other than that noted in "the HPI  Constitutional: Negative for fatigue and fever  Physical Exam:    Blood pressure 132/76, pulse 76, temperature 98 5 °F (36 9 °C), temperature source Temporal, resp  rate 18, height 5' 2\" (1 575 m), weight 79 8 kg (176 lb), last menstrual period 06/03/2023, SpO2 98 %  General/Constitutional: NAD, well developed, well nourished  HENT: Normocephalic, atraumatic  CV: Intact distal pulses, regular rate  Resp: No respiratory distress or labored breathing  GI: Soft and non-tender   Lymphatic: No lymphadenopathy palpated  Neuro: Alert and Oriented x 3, no focal deficits  Psych: Normal mood, normal affect, normal judgement, normal behavior  Skin: Warm, dry, no rashes, no erythema      Knee Exam (focused): RIGHT LEFT   ROM:   0-130 0-130   Palpation: Effusion negative mild     MJL tenderness Negative Positive     LJL tenderness Negative Negative   Meniscus: Sabino Negative Negative    Apley's Compression Negative Negative   Instability: Varus stable stable     Valgus stable stable   Special Tests: Lachman Negative Negative     Posterior drawer Negative Negative     Anterior drawer Negative Negative     Pivot shift not tested not tested     Dial not tested not tested   Patella: Palpation no tenderness no tenderness     Mobility 1/4 3/4     Apprehension Negative Positive   Other: Single leg 1/4 squat not tested not tested           LE NV Exam: +2 DP/PT pulses bilaterally  Sensation intact to light touch L2-S1 bilaterally    No calf tenderness to palpation bilaterally      Knee Imaging    MRI of the left knee was reviewed with the patient, which demonstrated bone contusion in the anterior lateral femoral condyle, and mild impaction fracture of the inferior medial patellar facet consistent with recent lateral patellar dislocation  Partial tear of MPFL is also seen         Scribe Attestation    I,:   am acting as a scribe while in the presence of the attending physician :       I,:   personally " performed the services described in this documentation    as scribed in my presence :

## 2023-07-10 ENCOUNTER — OFFICE VISIT (OUTPATIENT)
Dept: OBGYN CLINIC | Facility: MEDICAL CENTER | Age: 38
End: 2023-07-10

## 2023-07-10 VITALS
DIASTOLIC BLOOD PRESSURE: 83 MMHG | HEART RATE: 88 BPM | BODY MASS INDEX: 32.39 KG/M2 | HEIGHT: 62 IN | SYSTOLIC BLOOD PRESSURE: 115 MMHG | WEIGHT: 176 LBS

## 2023-07-10 DIAGNOSIS — M25.362 PATELLAR INSTABILITY OF LEFT KNEE: ICD-10-CM

## 2023-07-10 DIAGNOSIS — M22.02 RECURRENT DISLOCATION OF LEFT PATELLA: Primary | ICD-10-CM

## 2023-07-10 PROCEDURE — 99024 POSTOP FOLLOW-UP VISIT: CPT | Performed by: ORTHOPAEDIC SURGERY

## 2023-07-10 NOTE — PROGRESS NOTES
Knee Post Operative Visit     Assesment:     Surgical Arthroscopy of the left knee with MPFL reconstruction with allograft and lateral release    Plan:    Post-Operative treatment:    Ice to knee for 20 minutes at least 1-2 times daily. PT per protocol    Imaging: All imaging from today was reviewed by myself and explained to the patient. Weight bearing:  as tolerated with brace locked straight     ROM:  0-90    Brace:  Keep brace locked in extension for ambulation    DVT Prophylaxis:  Aspirin 81mg oral twice daily x 6 weeks    Follow up:   5 weeks     Patient was advised that if they have any fevers, chills, chest pain, shortness of breath, redness or drainage from the incision, please let our office know immediately. History of Present Illness: The patient is a 41 y.o. female who is being evaluated post operatively 1 week  status post MPFL reconstruction with gracilis allograft with arthroscopic lateral release 6/27/23. Since the prior visit, She reports minimal improvement. Pain is well controlled. The patient is using ice to control swelling. They have not started physical therapy. The patient has been ambulating with crutches. The patient has been ambulating with a brace. The patient denies any fevers, chills, calf pain, chest pain/shortness of breath, redness or drainage from the incision. I have reviewed the past medical, surgical, social and family history, medications and allergies as documented in the EMR. Review of systems: ROS is negative other than that noted in the HPI. Constitutional: Negative for fatigue and fever. Physical Exam:    Blood pressure 115/83, pulse 88, height 5' 2" (1.575 m), weight 79.8 kg (176 lb), last menstrual period 06/03/2023.     General/Constitutional: NAD, well developed, well nourished  HENT: Normocephalic, atraumatic  CV: Intact distal pulses, regular rate  Resp: No respiratory distress or labored breathing  Lymphatic: No lymphadenopathy palpated  Neuro: Alert and Oriented x 3, no focal deficits  Psych: Normal mood, normal affect, normal judgement, normal behavior  Skin: Warm, dry, no rashes, no erythema       Knee Exam (focused):                   RIGHT LEFT   ROM:   0-130 0-70   Palpation: Effusion negative negative     MJL tenderness Negative Positive     LJL tenderness Negative Negative   Instability: Varus stable stable     Valgus stable stable   Special Tests: Lachman Negative Negative     Posterior drawer Negative Negative     Anterior drawer Negative Negative     Pivot shift not tested not tested     Dial not tested not tested   Patella: Palpation no tenderness no tenderness     Mobility 1/4 1/4     Apprehension Negative Negative   Other: Single leg 1/4 squat not tested not tested      Incisions show no erythema, no drainage    LE NV Exam: +2 DP/PT pulses bilaterally  Sensation intact to light touch L2-S1 bilaterally     Bilateral hip ROM demonstrates no pain actively or passively    No calf tenderness to palpation bilaterally      Scribe Attestation    I,:   am acting as a scribe while in the presence of the attending physician.:       I,:   personally performed the services described in this documentation    as scribed in my presence.:

## 2023-07-18 ENCOUNTER — EVALUATION (OUTPATIENT)
Dept: PHYSICAL THERAPY | Facility: MEDICAL CENTER | Age: 38
End: 2023-07-18
Payer: COMMERCIAL

## 2023-07-18 DIAGNOSIS — M22.02 RECURRENT DISLOCATION OF LEFT PATELLA: ICD-10-CM

## 2023-07-18 DIAGNOSIS — Z47.89 ORTHOPEDIC AFTERCARE: ICD-10-CM

## 2023-07-18 DIAGNOSIS — Z87.39 S/P MEDIAL PATELLOFEMORAL LIGAMENT RECONSTRUCTION: Primary | ICD-10-CM

## 2023-07-18 DIAGNOSIS — M25.362 PATELLAR INSTABILITY OF LEFT KNEE: ICD-10-CM

## 2023-07-18 DIAGNOSIS — Z98.890 S/P MEDIAL PATELLOFEMORAL LIGAMENT RECONSTRUCTION: Primary | ICD-10-CM

## 2023-07-18 PROCEDURE — 97161 PT EVAL LOW COMPLEX 20 MIN: CPT | Performed by: PHYSICAL THERAPIST

## 2023-07-18 PROCEDURE — 97110 THERAPEUTIC EXERCISES: CPT | Performed by: PHYSICAL THERAPIST

## 2023-07-18 NOTE — PROGRESS NOTES
PT Evaluation     Today's date: 2023  Patient name: Placido Jones  : 1985  MRN: [id number]  Referring provider: Wendy Sharif DO  Dx:   Encounter Diagnosis     ICD-10-CM    1. S/P medial patellofemoral ligament reconstruction  Z98.890     Z87.39       2. Recurrent dislocation of left patella  M22.02 Ambulatory Referral to Physical Therapy      3. Patellar instability of left knee  M25.362 Ambulatory Referral to Physical Therapy      4. Orthopedic aftercare  Z47.89                      Assessment  Assessment details: Placido Jones is a pleasant 41 y.o. female presenting 3 weeks s/p L MPFL reconstruction with gracilis allograft and lateral release (DOS: 23). She is currently WBAT with her knee immobilizer locked at 0 deg for ambulation. No further referral is necessary at this time based upon examination results. Primary movement impairment is L knee hypomobility as expected 3 weeks s/p L MPFL reconstruction and lateral release (DOS: 23), which limits her ability to ambulate and perform transfers. Patient also presents with L quadriceps and hamstrings weakness as expected post-operatively, which further limits her ability to participate in ADL and gym routine. Patient was educated in an illustrated HEP for quad activation, gastroc/HS flexibility, and knee FLX ROM within protocol limits. Her immobilizer was also adjusted for proper fit. Patient would benefit from skilled PT services to address the listed impairments to facilitate a return to PLOF.  Thank you for the referral.  Impairments: abnormal coordination, abnormal gait, abnormal muscle firing, abnormal muscle tone, abnormal or restricted ROM, abnormal movement, activity intolerance, impaired balance, impaired physical strength, lacks appropriate home exercise program and pain with function  Functional limitations: ambulation, standing, transfers, stair negotiation, gym routine  Symptom irritability: lowBarriers to therapy: none  Understanding of Dx/Px/POC: good   Prognosis: good  Prognosis details: Positive prognostic factors include positive attitude towards recovery. No negative prognostic factors. Goals  ST. Patient will be independent in an illustrated HEP. 2. Patient will be able to perform a proper QS to demonstrate improved knee strength for ambulation. LT. Patient will achieve L knee FLX AROM equal to the contralateral side by 6 weeks post-op (23) to improve quality of gait mechanics and stair/curb negotiation. 2. Patient will increase L quad and hamstring strength to 5/5 by time of discharge to return to active lifestyle. 3. Patient will be able to return to gym routine with modifications as necessary. 4. Patient will be able to manage symptoms independently. Plan  Plan details: Prognosis is above given PT services 2x/week tapering to 1x/week over the next 12 weeks and given HEP adherence. Patient would benefit from: skilled physical therapy  Referral necessary: No  Planned modality interventions: cryotherapy, TENS and thermotherapy: hydrocollator packs (NMES)  Planned therapy interventions: activity modification, balance, balance/weight bearing training, body mechanics training, flexibility, functional ROM exercises, gait training, graded activity, home exercise program, graded exercise, joint mobilization, kinesiology taping, manual therapy, massage, Price taping, neuromuscular re-education, patient education, strengthening, stretching, therapeutic activities and therapeutic exercise  Frequency: 2x week  Duration in weeks: 12  Treatment plan discussed with: patient        Subjective Evaluation    History of Present Illness  Date of surgery: 2023  Mechanism of injury: surgery  Mechanism of injury: This is a 41 y.o. female presenting 3 weeks s/p L MPFL reconstruction with gracilis allograft and lateral release (DOS: 23).  She injured her knee when skating in 2023 when she fell and dislocated her knee. She has a history of recurrent dislocations since she was a teenager. She was able to self-reduce and proceeded to the ED due to pain and swelling. She received x-rays that were negative for fracture. She followed up with ortho in 2023 and received an MRI that showed bone contusion and partial tear of MPFL. She proceeded with surgery on 23. She had a follow-up with ortho on 7/10/23, and she has been cleared to initiate PT. She has been walking with her brace locked at 0 deg. Overall, she reports that her pain has been well-controlled, and she has not had to take any pain medication for the last 5 days. Quality of life: good    Patient Goals  Patient goals for therapy: decreased pain, increased strength, return to sport/leisure activities, independence with ADLs/IADLs and increased motion  Patient goal: to be able to stand, walk, return to gym routine  Pain  Current pain ratin  At best pain ratin  At worst pain ratin  Location: L anterior knee  Relieving factors: ice      Diagnostic Tests  MRI studies: abnormal (prior to surgery- bone contusion/MPFL tear)  Treatments  Current treatment: immobilization        Objective     Observations   Left Knee   Positive for edema (very mild- L anterior knee) and incision.      Additional Observation Details  3 incisions on L anterior knee were visualized and are healing well with no drainage, no erythema, no warmth    Active Range of Motion   Left Knee   Flexion: 35 (improved to 65 deg after heel slides) degrees   Extension: 0 degrees     Right Knee   Hyperextension   Flexion: 145 degrees   Extension: -10 degrees     Strength/Myotome Testing     Left Knee   Quadriceps contraction: poor    Right Knee   Flexion: 5  Extension: 5  Quadriceps contraction: good    Additional Strength Details  L LE strength testing deferred due to post-op period    Unable to perform L SLR    Tests     Additional Tests Details  L LE special testing deferred due to post-op period    Ambulation   Weight-Bearing Status   Weight-Bearing Status (Left): weight-bearing as tolerated   with immobilizer    Observational Gait   Gait: antalgic              Precautions: s/p L MPFL reconstruction with gracilis allograft and lateral release (DOS: 6/27/23)- SEE PROTOCOL ATTACHED TO CHART    ROM 0-90 1st 3 weeks  Unlock brace for ambulation after week 3    HEP: QS (towel knee), strap gastroc stretch, heel slides  Manuals 7/18            Unlock brace NV                                                    Neuro Re-Ed                                                                                                        Ther Ex             QS 5"x20 towel knee HEP  + towel ankle           Strap gastroc stretch 30"x4 HEP            Long sit HS stretch NV            Heel slides 5"x20 HEP            SLR- 4-way NV in locked brace            HEP education and instruction x10'                                      Ther Activity                                       Gait Training                                       Modalities

## 2023-07-21 ENCOUNTER — OFFICE VISIT (OUTPATIENT)
Dept: PHYSICAL THERAPY | Facility: MEDICAL CENTER | Age: 38
End: 2023-07-21
Payer: COMMERCIAL

## 2023-07-21 DIAGNOSIS — M22.02 RECURRENT DISLOCATION OF LEFT PATELLA: ICD-10-CM

## 2023-07-21 DIAGNOSIS — Z87.39 S/P MEDIAL PATELLOFEMORAL LIGAMENT RECONSTRUCTION: Primary | ICD-10-CM

## 2023-07-21 DIAGNOSIS — M25.362 PATELLAR INSTABILITY OF LEFT KNEE: ICD-10-CM

## 2023-07-21 DIAGNOSIS — Z98.890 S/P MEDIAL PATELLOFEMORAL LIGAMENT RECONSTRUCTION: Primary | ICD-10-CM

## 2023-07-21 DIAGNOSIS — Z47.89 ORTHOPEDIC AFTERCARE: ICD-10-CM

## 2023-07-21 PROCEDURE — 97110 THERAPEUTIC EXERCISES: CPT | Performed by: PHYSICAL THERAPIST

## 2023-07-21 NOTE — PROGRESS NOTES
Daily Note     Today's date: 2023  Patient name: Misti Hernandez  : 1985  MRN: [id number]  Referring provider: Earnestine Elmore DO  Dx:   Encounter Diagnosis     ICD-10-CM    1. Recurrent dislocation of left patella  M22.02       2. Patellar instability of left knee  M25.362       3. S/P medial patellofemoral ligament reconstruction  Z98.890     Z87.39       4. Orthopedic aftercare  Z47.89                      Subjective: Patient reports that she has been feeling good since her last session, and she has not been having any pain in her knee. She reports that she has been having some discomfort and mild swelling in the outside of her L ankle yesterday, but this has resolved by today. Objective: See treatment diary below    L knee AROM: 0-70 deg    Assessment: Patient demonstrated improved quality of QS today compared to last session with less compensation from gluts/ankles. She continues to demonstrate L knee FLX hypomobility but responded well to heel slides with an approx 5 deg improvement in FLX AROM today compared to last session. Patient demonstrated excessive ankle PF during heel slides. No visible edema present in L ankle today, and L ankle soreness is most likely related to compensation from ankle musculature during knee FLX exercises. Initiated 4-way SLR in locked brace with max assist provided to perform FLX SLR. Patient was educated to continue to wear immobilizer locked at 0 deg until quad strength improves due to inability to perform independent SLR. She was educated to add QS with towel under ankle to HEP to improve quad strength. She was also educated on the importance of consistent HEP performance with a focus on knee FLX mobility within a pain-free range. Patient tolerated treatment well and would benefit from continued PT to address impairments to maximize function. Plan: Continue per plan of care.       Precautions: s/p L MPFL reconstruction with gracilis allograft and lateral release (DOS: 6/27/23)- SEE PROTOCOL ATTACHED TO CHART    ROM 0-90 1st 3 weeks  Unlock brace for ambulation after week 3    HEP: QS (towel knee/ankle), strap gastroc stretch, heel slides  Manuals 7/18 7/21                                                               Neuro Re-Ed                                                                                                        Ther Ex             QS 5"x20 towel knee HEP  5"x30 towel knee/  5"x30 towel ankle           Strap gastroc stretch 30"x4 HEP 30"x4           Long sit HS stretch NV 30"x4           Heel slides 5"x20 HEP 5"x30           SLR- 4-way NV in locked brace x10 ea 4-way in locked brace           HEP education and instruction x10'                                      Ther Activity                                       Gait Training                                       Modalities

## 2023-07-24 ENCOUNTER — OFFICE VISIT (OUTPATIENT)
Dept: PHYSICAL THERAPY | Facility: MEDICAL CENTER | Age: 38
End: 2023-07-24
Payer: COMMERCIAL

## 2023-07-24 DIAGNOSIS — Z98.890 S/P MEDIAL PATELLOFEMORAL LIGAMENT RECONSTRUCTION: Primary | ICD-10-CM

## 2023-07-24 DIAGNOSIS — Z47.89 ORTHOPEDIC AFTERCARE: ICD-10-CM

## 2023-07-24 DIAGNOSIS — M25.362 PATELLAR INSTABILITY OF LEFT KNEE: ICD-10-CM

## 2023-07-24 DIAGNOSIS — Z87.39 S/P MEDIAL PATELLOFEMORAL LIGAMENT RECONSTRUCTION: Primary | ICD-10-CM

## 2023-07-24 DIAGNOSIS — M22.02 RECURRENT DISLOCATION OF LEFT PATELLA: ICD-10-CM

## 2023-07-24 PROCEDURE — 97110 THERAPEUTIC EXERCISES: CPT | Performed by: PHYSICAL THERAPIST

## 2023-07-24 NOTE — PROGRESS NOTES
Daily Note     Today's date: 2023  Patient name: Placido Jones  : 1985  MRN: [id number]  Referring provider: Wendy Sharif DO  Dx:   Encounter Diagnosis     ICD-10-CM    1. S/P medial patellofemoral ligament reconstruction  Z98.890     Z87.39       2. Patellar instability of left knee  M25.362       3. Recurrent dislocation of left patella  M22.02       4. Orthopedic aftercare  Z47.89                      Subjective: Patient reports that she had some mild soreness in her knee after working on bending her knee at her last PT session. She reports no other changes. Objective: See treatment diary below    L knee AROM: 0-85 deg    Assessment: Patient continues to demonstrate L knee FLX hypomobility but demonstrated an approx 15 deg improvement in L knee FLX AROM after heel slides compared to presentation today. Added seated knee FLX AAROM at the edge of bed today to further improve mobility, and she was educated to add this exercise to HEP. QS quality continues to improve upon 4-way SLR. However, max assistance is required to initiate FLX SLR. Patient was educated that she may unlock brace when sitting to improve knee FLX ROM. She was educated to continue to ambulate with brace locked at 0 deg EXT until quad control improves and she is able to perform SLR independently. She was educated to increase reps for QS and heel slides during HEP. CP applied to knee post-tx to minimize soreness. Patient tolerated treatment well and would benefit from continued PT. Plan: Continue per plan of care.       Precautions: s/p L MPFL reconstruction with gracilis allograft and lateral release (DOS: 23)- SEE PROTOCOL ATTACHED TO CHART    ROM 0-90 1st 3 weeks  Unlock brace for ambulation after week 3    HEP: QS (towel knee/ankle), strap gastroc stretch, heel slides, sitting EOB  Manuals                                                               Neuro Re-Ed Ther Ex             QS 5"x20 towel knee HEP  5"x30 towel knee/  5"x30 towel ankle 5"x40 towel knee/5"x40 towel ankle          Strap gastroc stretch 30"x4 HEP 30"x4 30"x4          Long sit HS stretch NV 30"x4 30"x4          Heel slides 5"x20 HEP 5"x30 5"x40          Knee FLX AAROM edge of plinth   5"x20 using FISH RIOJASR- 4-way NV in locked brace x10 ea 4-way in locked brace x15 ea 4-way in locked brace          HEP education and instruction x10'                                      Ther Activity                                       Gait Training                                       Modalities             CP to L knee   x10'

## 2023-07-28 ENCOUNTER — OFFICE VISIT (OUTPATIENT)
Dept: PHYSICAL THERAPY | Facility: MEDICAL CENTER | Age: 38
End: 2023-07-28
Payer: COMMERCIAL

## 2023-07-28 DIAGNOSIS — Z87.39 S/P MEDIAL PATELLOFEMORAL LIGAMENT RECONSTRUCTION: Primary | ICD-10-CM

## 2023-07-28 DIAGNOSIS — Z47.89 ORTHOPEDIC AFTERCARE: ICD-10-CM

## 2023-07-28 DIAGNOSIS — Z98.890 S/P MEDIAL PATELLOFEMORAL LIGAMENT RECONSTRUCTION: Primary | ICD-10-CM

## 2023-07-28 DIAGNOSIS — M25.362 PATELLAR INSTABILITY OF LEFT KNEE: ICD-10-CM

## 2023-07-28 DIAGNOSIS — M22.02 RECURRENT DISLOCATION OF LEFT PATELLA: ICD-10-CM

## 2023-07-28 PROCEDURE — 97110 THERAPEUTIC EXERCISES: CPT | Performed by: PHYSICAL THERAPIST

## 2023-07-28 NOTE — PROGRESS NOTES
Daily Note     Today's date: 2023  Patient name: Meenu Clements  : 1985  MRN: [id number]  Referring provider: Dareen Felty, DO  Dx:   Encounter Diagnosis     ICD-10-CM    1. S/P medial patellofemoral ligament reconstruction  Z98.890     Z87.39       2. Patellar instability of left knee  M25.362       3. Recurrent dislocation of left patella  M22.02       4. Orthopedic aftercare  Z47.89                      Subjective: Patient reports that her knee is feeling pretty good overall, but she has some pulling in the front of her knee when she stands up after sitting for a long period of time. She notes that it is getting easier to raise her L leg to get into and out of her tub. Objective: See treatment diary below    L knee AROM: 0-91 deg (supine), 0-95 deg (seated)    Assessment: Patient demonstrates L knee FLX hypomobility but responds well to active heel slides with an improvement in FLX ROM post-tx compared to presentation. She also demonstrated an approx 5-6 deg improvement in FLX AROM today compared to last session. Increased reps for QS to improve quad strength. QS quality has improved today, but she is unable to perform an independent SLR. Due to lack of quad control and inability to perform SLR, patient was educated to continue to ambulate with brace locked at 0 deg of EXT. Patient was educated to d/c her brace for sleeping per protocol and was educated that she may unlock her brace for sitting. She was educated to increase reps for QS during HEP and increase duration of end-range hold time during knee FLX ROM stretching during HEP. Patient tolerated treatment well and completed program without pain. Patient would benefit from continued PT      Plan: Continue per plan of care. Unlock brace for ambulation when patient can initiate SLR independently.      Precautions: s/p L MPFL reconstruction with gracilis allograft and lateral release (DOS: 23)- SEE PROTOCOL ATTACHED TO CHART      HEP: QS (towel knee/ankle), strap gastroc stretch, heel slides, sitting EOB  Manuals 7/18 7/21 7/24 7/28         PROM                                                     Neuro Re-Ed                                                                                                        Ther Ex             QS 5"x20 towel knee HEP  5"x30 towel knee/  5"x30 towel ankle 5"x40 towel knee/5"x40 towel ankle 5"x50 towel knee/5"x50 towel ankle         Strap gastroc stretch 30"x4 HEP 30"x4 30"x4 30"x4         Long sit HS stretch NV 30"x4 30"x4 30"x4         Heel slides 5"x20 HEP 5"x30 5"x40 10"x30         Knee FLX AAROM edge of plinth   5"x20 using R LE 10"x20 using R LE         SLR- 4-way NV in locked brace x10 ea 4-way in locked brace x15 ea 4-way in locked brace x7 FLX no brace (max A), x13 FLX/x20 3-way in locked brace         HEP education and instruction x10'                                      Ther Activity                                       Gait Training                                       Modalities             CP to L knee   x10'

## 2023-07-31 ENCOUNTER — OFFICE VISIT (OUTPATIENT)
Dept: PHYSICAL THERAPY | Facility: MEDICAL CENTER | Age: 38
End: 2023-07-31
Payer: COMMERCIAL

## 2023-07-31 DIAGNOSIS — Z98.890 S/P MEDIAL PATELLOFEMORAL LIGAMENT RECONSTRUCTION: Primary | ICD-10-CM

## 2023-07-31 DIAGNOSIS — Z47.89 ORTHOPEDIC AFTERCARE: ICD-10-CM

## 2023-07-31 DIAGNOSIS — M25.362 PATELLAR INSTABILITY OF LEFT KNEE: ICD-10-CM

## 2023-07-31 DIAGNOSIS — M22.02 RECURRENT DISLOCATION OF LEFT PATELLA: ICD-10-CM

## 2023-07-31 DIAGNOSIS — Z87.39 S/P MEDIAL PATELLOFEMORAL LIGAMENT RECONSTRUCTION: Primary | ICD-10-CM

## 2023-07-31 PROCEDURE — 97110 THERAPEUTIC EXERCISES: CPT | Performed by: PHYSICAL THERAPIST

## 2023-07-31 NOTE — PROGRESS NOTES
Daily Note     Today's date: 2023  Patient name: Cathie Carmona  : 1985  MRN: [id number]  Referring provider: Nasima Kuhn DO  Dx:   Encounter Diagnosis     ICD-10-CM    1. S/P medial patellofemoral ligament reconstruction  Z98.890     Z87.39       2. Patellar instability of left knee  M25.362       3. Recurrent dislocation of left patella  M22.02       4. Orthopedic aftercare  Z47.89                      Subjective: Patient reports that her knee is feeling pretty good, and she does not have as much discomfort in the front of her knee when sitting with her knee bent. She also notes that it is continuing to get easier to raise her leg to get into and out of her shower. Objective: See treatment diary below    L knee AROM: 0-120 deg    Assessment: Patient demonstrated a significant improvement in L knee FLX AROM today compared to prior sessions. In addition, she was able to independently perform SLR today, which demonstrates an improvement in quad control. Due to ability to perform independent SLR, patient was educated that she may unlock her brace for ambulation. She was educated to return to locking her brace in crowded community environments for safety. She was also educated to reduce gait speed when ambulating at home with unlocked brace and focus on knee FLX during swing. Patient tolerated treatment well and completed program without pain. Patient would benefit from continued PT to improve knee ROM and strength to return to PLOF. Plan: Continue per plan of care.       Precautions: s/p L MPFL reconstruction with gracilis allograft and lateral release (DOS: 23)- SEE PROTOCOL ATTACHED TO CHART      HEP: QS (towel knee/ankle), strap gastroc stretch, heel slides, sitting EOB  Manuals         PROM                                                     Neuro Re-Ed Ther Ex             QS 5"x20 towel knee HEP  5"x30 towel knee/  5"x30 towel ankle 5"x40 towel knee/5"x40 towel ankle 5"x50 towel knee/5"x50 towel ankle 5"x50 ea        Strap gastroc stretch 30"x4 HEP 30"x4 30"x4 30"x4 30"x4        Long sit HS stretch NV 30"x4 30"x4 30"x4 30"x4        Heel slides 5"x20 HEP 5"x30 5"x40 10"x30 10"x30        Knee FLX AAROM edge of plinth   5"x20 using R LE 10"x20 using R LE 10"x30 using R LE        SLR- 4-way NV in locked brace x10 ea 4-way in locked brace x15 ea 4-way in locked brace x7 FLX no brace (max A), x13 FLX/x20 3-way in locked brace 2x10 ea (no brace)        HEP education and instruction x10'                                      Ther Activity                                       Gait Training                                       Modalities             CP to L knee   x10'

## 2023-08-03 ENCOUNTER — APPOINTMENT (OUTPATIENT)
Dept: PHYSICAL THERAPY | Facility: MEDICAL CENTER | Age: 38
End: 2023-08-03
Payer: COMMERCIAL

## 2023-08-08 ENCOUNTER — OFFICE VISIT (OUTPATIENT)
Dept: PHYSICAL THERAPY | Facility: MEDICAL CENTER | Age: 38
End: 2023-08-08
Payer: COMMERCIAL

## 2023-08-08 DIAGNOSIS — Z47.89 ORTHOPEDIC AFTERCARE: ICD-10-CM

## 2023-08-08 DIAGNOSIS — Z98.890 S/P MEDIAL PATELLOFEMORAL LIGAMENT RECONSTRUCTION: Primary | ICD-10-CM

## 2023-08-08 DIAGNOSIS — Z87.39 S/P MEDIAL PATELLOFEMORAL LIGAMENT RECONSTRUCTION: Primary | ICD-10-CM

## 2023-08-08 DIAGNOSIS — M25.362 PATELLAR INSTABILITY OF LEFT KNEE: ICD-10-CM

## 2023-08-08 DIAGNOSIS — M22.02 RECURRENT DISLOCATION OF LEFT PATELLA: ICD-10-CM

## 2023-08-08 PROCEDURE — 97110 THERAPEUTIC EXERCISES: CPT | Performed by: PHYSICAL THERAPIST

## 2023-08-08 NOTE — PROGRESS NOTES
Daily Note     Today's date: 2023  Patient name: Amari Orellana  : 1985  MRN: [id number]  Referring provider: Vicente Mancia DO  Dx:   Encounter Diagnosis     ICD-10-CM    1. S/P medial patellofemoral ligament reconstruction  Z98.890     Z87.39       2. Patellar instability of left knee  M25.362       3. Recurrent dislocation of left patella  M22.02       4. Orthopedic aftercare  Z47.89                      Subjective: Patient reports that she had a lot of soreness in the front of her knee yesterday after standing for many hours when moving for 2 days over this past weekend. The soreness has subsided today, and she does feel that she is progressing overall. Objective: See treatment diary below    L knee AROM: 0-126 deg    Assessment: Patient continues to demonstrate steady progress with improving L knee AROM 6 weeks s/p R MPFL reconstruction and lateral release. L knee FLX AROM improved approx 6 deg today compared to last session. Patient continues to demonstrate improvements in quad control upon SLR each session. Patient is now able to perform 5 consistent reps of modified range SLR without EXT lag. Patient was educated to add FLX SLR to HEP in a modified range to improve quad strength. She was educated to avoid performing SLR if she develops pain. Held progressions to CKC strengthening due to report of recent soreness and to prevent overload due to period of repetitive standing when moving. Patient tolerated treatment well and reported no pain during or after session. Patient would benefit from continued PT to improve knee mobility and strength to achieve goals. Plan: Continue per plan of care.       Precautions: s/p L MPFL reconstruction with gracilis allograft and lateral release (DOS: 23)- SEE PROTOCOL ATTACHED TO CHART      HEP: QS (towel knee/ankle), strap gastroc stretch, heel slides, sitting EOB, FLX SLR  Manuals        PROM      KP np Neuro Re-Ed                                                                                                        Ther Ex             QS 5"x20 towel knee HEP  5"x30 towel knee/  5"x30 towel ankle 5"x40 towel knee/5"x40 towel ankle 5"x50 towel knee/5"x50 towel ankle 5"x50 ea 5"x50 ea       Strap gastroc stretch 30"x4 HEP 30"x4 30"x4 30"x4 30"x4 30"x4       Long sit HS stretch NV 30"x4 30"x4 30"x4 30"x4 30"x4       Heel slides 5"x20 HEP 5"x30 5"x40 10"x30 10"x30 10"x30       Knee FLX AAROM edge of plinth   5"x20 using R LE 10"x20 using R LE 10"x30 using R LE HEP       SLR- 4-way NV in locked brace x10 ea 4-way in locked brace x15 ea 4-way in locked brace x7 FLX no brace (max A), x13 FLX/x20 3-way in locked brace 2x10 ea (no brace) 2x5 FLX, 2x10 ABD/ADD/EXT       Heel raises      NV       Mini squats 0-45             HEP education and instruction x10'                                      Ther Activity                                       Gait Training                                       Modalities             CP to L knee   x10'

## 2023-08-10 ENCOUNTER — OFFICE VISIT (OUTPATIENT)
Dept: PHYSICAL THERAPY | Facility: MEDICAL CENTER | Age: 38
End: 2023-08-10
Payer: COMMERCIAL

## 2023-08-10 DIAGNOSIS — Z98.890 S/P MEDIAL PATELLOFEMORAL LIGAMENT RECONSTRUCTION: Primary | ICD-10-CM

## 2023-08-10 DIAGNOSIS — Z47.89 ORTHOPEDIC AFTERCARE: ICD-10-CM

## 2023-08-10 DIAGNOSIS — Z87.39 S/P MEDIAL PATELLOFEMORAL LIGAMENT RECONSTRUCTION: Primary | ICD-10-CM

## 2023-08-10 DIAGNOSIS — M25.362 PATELLAR INSTABILITY OF LEFT KNEE: ICD-10-CM

## 2023-08-10 DIAGNOSIS — M22.02 RECURRENT DISLOCATION OF LEFT PATELLA: ICD-10-CM

## 2023-08-10 PROCEDURE — 97110 THERAPEUTIC EXERCISES: CPT | Performed by: PHYSICAL THERAPIST

## 2023-08-10 NOTE — PROGRESS NOTES
Daily Note     Today's date: 8/10/2023  Patient name: Kaylin King  : 1985  MRN: [id number]  Referring provider: Graciela Chen DO  Dx:   Encounter Diagnosis     ICD-10-CM    1. S/P medial patellofemoral ligament reconstruction  Z98.890     Z87.39       2. Patellar instability of left knee  M25.362       3. Recurrent dislocation of left patella  M22.02       4. Orthopedic aftercare  Z47.89                      Subjective: Patient reports that her knee has been feeling pretty good over the last few days, and she has been able to do a lot of standing when moving into her house. She reports that she is not as sore today as she was prior to last session. She has been wearing her unlocked brace at all times except sleeping and when doing her exercises. She has a follow-up with her physician tomorrow (). Objective: See treatment diary below    L knee AROM: 0-140 deg    Assessment: Patient is continuing to demonstrate excellent progress with improving her L knee ROM 6+ weeks s/p L MPFL reconstruction and lateral release. L knee AROM was significantly improved today compared to last session and is nearly comparable to the contralateral side. She also is continuing to demonstrate improved quad control during SLR. Able to progress reps for 4-way SLR, which further demonstrates an improvement in multiplanar quad/hip girdle endurance. Added heel raises today to improve CKC strength. We have been cautious with initiating CKC quad strengthening progressions thus far to prevent overload due to patient performing prolonged standing at home during her recent move. Patient tolerated treatment well and completed program without pain. Patient would benefit from continued PT to improve knee ROM and strength to return to active lifestyle. Plan: Continue per plan of care.       Precautions: s/p L MPFL reconstruction with gracilis allograft and lateral release (DOS: 23)- SEE PROTOCOL ATTACHED TO CHART      HEP: QS (towel knee/ankle), strap gastroc stretch, heel slides, sitting EOB, FLX SLR  Manuals 7/18 7/21 7/24 7/28 7/31 8/8 8/10      PROM      KP np                                              Neuro Re-Ed                                                                                                        Ther Ex             QS 5"x20 towel knee HEP  5"x30 towel knee/  5"x30 towel ankle 5"x40 towel knee/5"x40 towel ankle 5"x50 towel knee/5"x50 towel ankle 5"x50 ea 5"x50 ea 5"x50 ea      Strap gastroc stretch 30"x4 HEP 30"x4 30"x4 30"x4 30"x4 30"x4 30"x4      Long sit HS stretch NV 30"x4 30"x4 30"x4 30"x4 30"x4 30"x4      Heel slides 5"x20 HEP 5"x30 5"x40 10"x30 10"x30 10"x30 10"x30      Knee FLX AAROM edge of plinth   5"x20 using R LE 10"x20 using R LE 10"x30 using R LE HEP       SLR- 4-way NV in locked brace x10 ea 4-way in locked brace x15 ea 4-way in locked brace x7 FLX no brace (max A), x13 FLX/x20 3-way in locked brace 2x10 ea (no brace) 2x5 FLX, 2x10 ABD/ADD/EXT 3x10 ea      Heel raises      NV 5"x30      HEP education and instruction x10'                                      Ther Activity                                       Gait Training                                       Modalities             CP to L knee   x10'

## 2023-08-11 ENCOUNTER — OFFICE VISIT (OUTPATIENT)
Dept: OBGYN CLINIC | Facility: MEDICAL CENTER | Age: 38
End: 2023-08-11

## 2023-08-11 VITALS
SYSTOLIC BLOOD PRESSURE: 135 MMHG | HEIGHT: 62 IN | BODY MASS INDEX: 32.39 KG/M2 | DIASTOLIC BLOOD PRESSURE: 85 MMHG | HEART RATE: 72 BPM | WEIGHT: 176 LBS

## 2023-08-11 DIAGNOSIS — M25.362 PATELLAR INSTABILITY OF LEFT KNEE: Primary | ICD-10-CM

## 2023-08-11 PROCEDURE — 99024 POSTOP FOLLOW-UP VISIT: CPT | Performed by: ORTHOPAEDIC SURGERY

## 2023-08-11 NOTE — PROGRESS NOTES
Knee Post Operative Visit     Assesment:     Surgical Arthroscopy of the left knee with MPFL reconstruction with allograft and lateral release performed on 6/27/23, doing well    Plan:    Post-Operative treatment:    Ice to knee for 20 minutes at least 1-2 times daily. PT as per post operative protocol. May transition into HEP as tolerated  OTC NSAIDS prn for pain. Imaging:    No imaging was available for review today. Weight bearing:  as tolerated     ROM:  Full    Brace:  No brace needed    DVT Prophylaxis:  Ambulation    Follow up:   6-8 weeks    Patient was advised that if they have any fevers, chills, chest pain, shortness of breath, redness or drainage from the incision, please let our office know immediately. History of Present Illness: The patient is a 41 y.o. female who is being evaluated post operatively 6 weeks  status post above mentioned procedure. Since the prior visit, She reports significant improvement. Pain is well controlled. The patient is using ice to control swelling. They have started physical therapy. The patient has been ambulating without crutches. The patient has been ambulating with a brace. The patient denies any fevers, chills, calf pain, chest pain/shortness of breath, redness or drainage from the incision. I have reviewed the past medical, surgical, social and family history, medications and allergies as documented in the EMR. Review of systems: ROS is negative other than that noted in the HPI. Constitutional: Negative for fatigue and fever. Physical Exam:    Height 5' 2" (1.575 m), weight 79.8 kg (176 lb).     General/Constitutional: NAD, well developed, well nourished  HENT: Normocephalic, atraumatic  CV: Intact distal pulses, regular rate  Resp: No respiratory distress or labored breathing  Lymphatic: No lymphadenopathy palpated  Neuro: Alert and Oriented x 3, no focal deficits  Psych: Normal mood, normal affect, normal judgement, normal behavior  Skin: Warm, dry, no rashes, no erythema       Knee Exam (focused):                   RIGHT LEFT   ROM:   0-130 0-120   Palpation: Effusion negative minimal     MJL tenderness Negative Negative     LJL tenderness Negative Negative   Instability: Varus stable stable     Valgus stable stable   Special Tests: Lachman Negative Negative     Posterior drawer Negative Negative     Anterior drawer Negative Negative     Pivot shift not tested not tested     Dial not tested not tested   Patella: Palpation no tenderness no tenderness     Mobility 1/4 1/4     Apprehension Negative Negative   Other: Single leg 1/4 squat not tested not tested      Incisions show no erythema, no drainage    LE NV Exam: +2 DP/PT pulses bilaterally  Sensation intact to light touch L2-S1 bilaterally     Bilateral hip ROM demonstrates no pain actively or passively    No calf tenderness to palpation bilaterally    Scribe Attestation    I,:  Curtis Mello am acting as a scribe while in the presence of the attending physician.:       I,:  Josh Arriaga DO personally performed the services described in this documentation    as scribed in my presence.:

## 2023-08-15 ENCOUNTER — APPOINTMENT (OUTPATIENT)
Dept: PHYSICAL THERAPY | Facility: MEDICAL CENTER | Age: 38
End: 2023-08-15
Payer: COMMERCIAL

## 2023-08-17 ENCOUNTER — OFFICE VISIT (OUTPATIENT)
Dept: PHYSICAL THERAPY | Facility: MEDICAL CENTER | Age: 38
End: 2023-08-17
Payer: COMMERCIAL

## 2023-08-17 DIAGNOSIS — M25.362 PATELLAR INSTABILITY OF LEFT KNEE: ICD-10-CM

## 2023-08-17 DIAGNOSIS — M22.02 RECURRENT DISLOCATION OF LEFT PATELLA: ICD-10-CM

## 2023-08-17 DIAGNOSIS — Z98.890 S/P MEDIAL PATELLOFEMORAL LIGAMENT RECONSTRUCTION: Primary | ICD-10-CM

## 2023-08-17 DIAGNOSIS — Z47.89 ORTHOPEDIC AFTERCARE: ICD-10-CM

## 2023-08-17 DIAGNOSIS — Z87.39 S/P MEDIAL PATELLOFEMORAL LIGAMENT RECONSTRUCTION: Primary | ICD-10-CM

## 2023-08-17 PROCEDURE — 97110 THERAPEUTIC EXERCISES: CPT | Performed by: PHYSICAL THERAPIST

## 2023-08-17 NOTE — PROGRESS NOTES
Daily Note     Today's date: 2023  Patient name: Onesimo Singer  : 1985  MRN: [id number]  Referring provider: Kelsy Rg DO  Dx:   Encounter Diagnosis     ICD-10-CM    1. S/P medial patellofemoral ligament reconstruction  Z98.890     Z87.39       2. Patellar instability of left knee  M25.362       3. Recurrent dislocation of left patella  M22.02       4. Orthopedic aftercare  Z47.89                      Subjective: Patient reports that she saw her physician last week, and she reports that they are pleased with her progress. They have advised her to remove her brace. She reports no pain and no significant changes since weaning out of her brace. Objective: See treatment diary below    L knee AROM: 0-140 deg    Assessment: Patient continues to demonstrate excellent progress with L knee FLX AROM. Continued with heel slides to ensure maintenance of ROM. She was also able to initiate resistance for 4-way SLR, which demonstrates good progress toward goals. Added wall gastroc stretch and standing HS curls to improve CKC knee mobility and HS strength. Also added gentle recumbent biking for mobility. Patient reported initial tightness when biking, but the tightness resolved after this intervention. All exercises were completed in a pain-free range. Patient would benefit from continued PT to address impairments to maximize function. Plan: Continue per plan of care.       Precautions: s/p L MPFL reconstruction with gracilis allograft and lateral release (DOS: 23)- SEE PROTOCOL ATTACHED TO CHART      HEP: QS (towel knee/ankle), strap gastroc stretch, heel slides, sitting EOB, FLX SLR  Manuals 7/18 7/21 7/24 7/28 7/31 8/8 8/10 8/17     PROM      KP np                                              Neuro Re-Ed                                                                                                        Ther Ex             Rec bike        3'     QS 5"x20 towel knee HEP  5"x30 towel knee/  5"x30 towel ankle 5"x40 towel knee/5"x40 towel ankle 5"x50 towel knee/5"x50 towel ankle 5"x50 ea 5"x50 ea 5"x50 ea 5"x50 ea     Strap gastroc stretch 30"x4 HEP 30"x4 30"x4 30"x4 30"x4 30"x4 30"x4      Long sit HS stretch NV 30"x4 30"x4 30"x4 30"x4 30"x4 30"x4      Heel slides 5"x20 HEP 5"x30 5"x40 10"x30 10"x30 10"x30 10"x30 x30     Knee FLX AAROM edge of plinth   5"x20 using R LE 10"x20 using R LE 10"x30 using R LE HEP       SLR- 4-way NV in locked brace x10 ea 4-way in locked brace x15 ea 4-way in locked brace x7 FLX no brace (max A), x13 FLX/x20 3-way in locked brace 2x10 ea (no brace) 2x5 FLX, 2x10 ABD/ADD/EXT 3x10 ea 2x10 ea 1#     Heel raises      NV 5"x30 5"x30     Standing HS curl        x30     Wall gastroc stretch        30"x3     HEP education and instruction x10'                                      Ther Activity                                       Gait Training                                       Modalities             CP to L knee   x10'

## 2023-08-22 ENCOUNTER — OFFICE VISIT (OUTPATIENT)
Dept: PHYSICAL THERAPY | Facility: MEDICAL CENTER | Age: 38
End: 2023-08-22
Payer: COMMERCIAL

## 2023-08-22 DIAGNOSIS — Z47.89 ORTHOPEDIC AFTERCARE: ICD-10-CM

## 2023-08-22 DIAGNOSIS — Z98.890 S/P MEDIAL PATELLOFEMORAL LIGAMENT RECONSTRUCTION: Primary | ICD-10-CM

## 2023-08-22 DIAGNOSIS — M22.02 RECURRENT DISLOCATION OF LEFT PATELLA: ICD-10-CM

## 2023-08-22 DIAGNOSIS — M25.362 PATELLAR INSTABILITY OF LEFT KNEE: ICD-10-CM

## 2023-08-22 DIAGNOSIS — Z87.39 S/P MEDIAL PATELLOFEMORAL LIGAMENT RECONSTRUCTION: Primary | ICD-10-CM

## 2023-08-22 PROCEDURE — 97110 THERAPEUTIC EXERCISES: CPT | Performed by: PHYSICAL THERAPIST

## 2023-08-22 NOTE — PROGRESS NOTES
Daily Note     Today's date: 2023  Patient name: Kay Lock  : 1985  MRN: [id number]  Referring provider: Yomaira Moon DO  Dx:   Encounter Diagnosis     ICD-10-CM    1. S/P medial patellofemoral ligament reconstruction  Z98.890     Z87.39       2. Patellar instability of left knee  M25.362       3. Recurrent dislocation of left patella  M22.02       4. Orthopedic aftercare  Z47.89                      Subjective: Patient reports that her knee has been feeling good overall. She has some discomfort on the inside of her knee today, but she also notes that she was doing a lot of walking and spending time on her feet at the pool 2 days ago. She is not having any pain today. Objective: See treatment diary below    L knee AROM: 0-140 deg    Assessment: Patient continues to demonstrate excellent progress with improving L knee FLX ROM. Patient reported alleviation of R medial knee discomfort when ambulating after heel slides. Quad control continues to improve during 4-way SLR. Added standing hip ABD, SLS, and toe raises to improve CKC strength. Cueing provided during standing hip ABD to prevent lateral trunk lean. Patient tolerated treatment well and completed program without pain. Patient would benefit from continued PT to improve knee mobility and strength to return to PLOF. Plan: Continue per plan of care.       Precautions: s/p L MPFL reconstruction with gracilis allograft and lateral release (DOS: 23)- SEE PROTOCOL ATTACHED TO CHART      HEP: QS (towel knee/ankle), strap gastroc stretch, heel slides, sitting EOB, FLX SLR  Manuals  8/8 8/10 8/17 8/22    PROM      KP np                                              Neuro Re-Ed                                                                                                        Ther Ex             Rec bike        3' 5'    QS 5"x20 towel knee HEP  5"x30 towel knee/  5"x30 towel ankle 5"x40 towel knee/5"x40 towel ankle 5"x50 towel knee/5"x50 towel ankle 5"x50 ea 5"x50 ea 5"x50 ea 5"x50 ea 5"x50 ea    Strap gastroc stretch 30"x4 HEP 30"x4 30"x4 30"x4 30"x4 30"x4 30"x4      Long sit HS stretch NV 30"x4 30"x4 30"x4 30"x4 30"x4 30"x4      Heel slides 5"x20 HEP 5"x30 5"x40 10"x30 10"x30 10"x30 10"x30 x30 x30    Knee FLX AAROM edge of plinth   5"x20 using R LE 10"x20 using R LE 10"x30 using R LE HEP       SLR- 4-way NV in locked brace x10 ea 4-way in locked brace x15 ea 4-way in locked brace x7 FLX no brace (max A), x13 FLX/x20 3-way in locked brace 2x10 ea (no brace) 2x5 FLX, 2x10 ABD/ADD/EXT 3x10 ea 2x10 ea 1# 2x10 ea 1#    Heel raises      NV 5"x30 5"x30 5"x30    Toe raises         5"x30    Standing hip ABD         2x10    Standing HS curl        x30 x30    SLS         10"x10    Wall gastroc stretch        30"x3 30"x4    HEP education and instruction x10'                                      Ther Activity                                       Gait Training                                       Modalities             CP to L knee   x10'

## 2023-08-24 ENCOUNTER — OFFICE VISIT (OUTPATIENT)
Dept: PHYSICAL THERAPY | Facility: MEDICAL CENTER | Age: 38
End: 2023-08-24
Payer: COMMERCIAL

## 2023-08-24 DIAGNOSIS — Z47.89 ORTHOPEDIC AFTERCARE: ICD-10-CM

## 2023-08-24 DIAGNOSIS — Z98.890 S/P MEDIAL PATELLOFEMORAL LIGAMENT RECONSTRUCTION: Primary | ICD-10-CM

## 2023-08-24 DIAGNOSIS — Z87.39 S/P MEDIAL PATELLOFEMORAL LIGAMENT RECONSTRUCTION: Primary | ICD-10-CM

## 2023-08-24 DIAGNOSIS — M22.02 RECURRENT DISLOCATION OF LEFT PATELLA: ICD-10-CM

## 2023-08-24 DIAGNOSIS — M25.362 PATELLAR INSTABILITY OF LEFT KNEE: ICD-10-CM

## 2023-08-24 PROCEDURE — 97110 THERAPEUTIC EXERCISES: CPT | Performed by: PHYSICAL THERAPIST

## 2023-08-24 NOTE — PROGRESS NOTES
Daily Note     Today's date: 2023  Patient name: Deejay Shukla  : 1985  MRN: [id number]  Referring provider: Kapil Benavidez DO  Dx:   Encounter Diagnosis     ICD-10-CM    1. S/P medial patellofemoral ligament reconstruction  Z98.890     Z87.39       2. Patellar instability of left knee  M25.362       3. Recurrent dislocation of left patella  M22.02       4. Orthopedic aftercare  Z47.89                      Subjective: Patient reports that she had some mild soreness in her knee after last session but no significant pain. She has some soreness and tightness again today. Overall, she notes that she is improving her ability to stand and walk. Objective: See treatment diary below    L knee AROM: 0-145 deg    Assessment: Patient reported decreased knee stiffness and soreness after recumbent bike JAQUEZ. Patient also reported further reduction in patellofemoral discomfort after heel slides. L knee FLX AROM continues to improve. Held progressions due to baseline soreness. Quad control continues to improve upon SLR. Patient demonstrated good technique with all exercises and was able to complete exercises without pain. Patient would benefit from continued PTto progress strengthening to return to active lifestyle. Plan: Continue per plan of care.       Precautions: s/p L MPFL reconstruction with gracilis allograft and lateral release (DOS: 23)- SEE PROTOCOL ATTACHED TO CHART      HEP: QS (towel knee/ankle), strap gastroc stretch, heel slides, sitting EOB, FLX SLR  Manuals  8/8 8/10 8/17 8/22 8/24   PROM      KP np                                              Neuro Re-Ed                                                                                                        Ther Ex             Rec bike        3' 5' 5'   QS 5"x20 towel knee HEP  5"x30 towel knee/  5"x30 towel ankle 5"x40 towel knee/5"x40 towel ankle 5"x50 towel knee/5"x50 towel ankle 5"x50 ea 5"x50 ea 5"x50 ea 5"x50 ea 5"x50 ea 5"x50 ea   Strap gastroc stretch 30"x4 HEP 30"x4 30"x4 30"x4 30"x4 30"x4 30"x4      Long sit HS stretch NV 30"x4 30"x4 30"x4 30"x4 30"x4 30"x4      Heel slides 5"x20 HEP 5"x30 5"x40 10"x30 10"x30 10"x30 10"x30 x30 x30 x30   Knee FLX AAROM edge of plinth   5"x20 using R LE 10"x20 using R LE 10"x30 using R LE HEP       SLR- 4-way NV in locked brace x10 ea 4-way in locked brace x15 ea 4-way in locked brace x7 FLX no brace (max A), x13 FLX/x20 3-way in locked brace 2x10 ea (no brace) 2x5 FLX, 2x10 ABD/ADD/EXT 3x10 ea 2x10 ea 1# 2x10 ea 1# 2x10 ea 1#   Heel raises      NV 5"x30 5"x30 5"x30 5"x30   Toe raises         5"x30 5"x30   Standing hip ABD         2x10 2x10   Standing HS curl        x30 x30 x30   SLS         10"x10 x10 to fatigue   Wall gastroc stretch        30"x3 30"x4 30"x4   HEP education and instruction x10'                                      Ther Activity                                       Gait Training                                       Modalities             CP to L knee   x10'

## 2023-08-29 ENCOUNTER — OFFICE VISIT (OUTPATIENT)
Dept: PHYSICAL THERAPY | Facility: MEDICAL CENTER | Age: 38
End: 2023-08-29
Payer: COMMERCIAL

## 2023-08-29 DIAGNOSIS — Z87.39 S/P MEDIAL PATELLOFEMORAL LIGAMENT RECONSTRUCTION: Primary | ICD-10-CM

## 2023-08-29 DIAGNOSIS — M25.362 PATELLAR INSTABILITY OF LEFT KNEE: ICD-10-CM

## 2023-08-29 DIAGNOSIS — Z47.89 ORTHOPEDIC AFTERCARE: ICD-10-CM

## 2023-08-29 DIAGNOSIS — M22.02 RECURRENT DISLOCATION OF LEFT PATELLA: ICD-10-CM

## 2023-08-29 DIAGNOSIS — Z98.890 S/P MEDIAL PATELLOFEMORAL LIGAMENT RECONSTRUCTION: Primary | ICD-10-CM

## 2023-08-29 PROCEDURE — 97110 THERAPEUTIC EXERCISES: CPT | Performed by: PHYSICAL THERAPIST

## 2023-08-29 NOTE — PROGRESS NOTES
Daily Note     Today's date: 2023  Patient name: Padmini Jeffery  : 1985  MRN: [id number]  Referring provider: Livia Mathews DO  Dx:   Encounter Diagnosis     ICD-10-CM    1. S/P medial patellofemoral ligament reconstruction  Z98.890     Z87.39       2. Patellar instability of left knee  M25.362       3. Recurrent dislocation of left patella  M22.02       4. Orthopedic aftercare  Z47.89                      Subjective: Patient reports that she continues to have some discomfort on the inside of her knee when she goes to stand after sitting for prolonged periods, but this resolves after she stretches. She overall is feeling stronger when standing and walking. She is continuing to go up and down the stairs with "step to" mechanics. Objective: See treatment diary below      Assessment: Patient continues to demonstrate L knee FLX AROM WFL and comparable to the contralateral side. She reported decreased L medial patellar tightness after heel slides. Patient was educated to avoid sitting in positions of prolonged end-range knee FLX or end-range knee EXT to reduce patellofemoral discomfort. She was also educated to perform seated heel sides when sitting at work to further improve mobility. Able to progress resistance for 4-way SLR, which demonstrates an improvement in multiplanar quad and hip girdle stability. Also added resistance to standing hip ABD and an unstable surface to SLS, which demonstrates an improvement in CKC strength. Patient tolerated treatment well and completed program without pain. Patient would benefit from continued PT to progress strengthening as appropriate to return to active lifestyle. Plan: Continue per plan of care.       Precautions: s/p L MPFL reconstruction with gracilis allograft and lateral release (DOS: 23)- SEE PROTOCOL ATTACHED TO CHART      HEP: QS (towel knee/ankle), strap gastroc stretch, heel slides, sitting EOB, FLX SLR  Manuals  7/31 8/8 8/10 8/17 8/22 8/24 8/29   PROM      KP np                                                  Neuro Re-Ed                                                                                                                Ther Ex              Rec bike        3' 5' 5' 5'   QS 5"x20 towel knee HEP  5"x30 towel knee/  5"x30 towel ankle 5"x40 towel knee/5"x40 towel ankle 5"x50 towel knee/5"x50 towel ankle 5"x50 ea 5"x50 ea 5"x50 ea 5"x50 ea 5"x50 ea 5"x50 ea 5"x50 ea   Strap gastroc stretch 30"x4 HEP 30"x4 30"x4 30"x4 30"x4 30"x4 30"x4       Long sit HS stretch NV 30"x4 30"x4 30"x4 30"x4 30"x4 30"x4       Heel slides 5"x20 HEP 5"x30 5"x40 10"x30 10"x30 10"x30 10"x30 x30 x30 x30 x30   Knee FLX AAROM edge of plinth   5"x20 using R LE 10"x20 using R LE 10"x30 using R LE HEP        SLR- 4-way NV in locked brace x10 ea 4-way in locked brace x15 ea 4-way in locked brace x7 FLX no brace (max A), x13 FLX/x20 3-way in locked brace 2x10 ea (no brace) 2x5 FLX, 2x10 ABD/ADD/EXT 3x10 ea 2x10 ea 1# 2x10 ea 1# 2x10 ea 1# 2x10 ea 2#   Heel raises      NV 5"x30 5"x30 5"x30 5"x30 5"x35   Toe raises         5"x30 5"x30 5"x35   Standing hip ABD         2x10 2x10 2x10 1#   Standing HS curl        x30 x30 x30 x30   SLS         10"x10 x10 to fatigue x10 to fatigue airex   Wall gastroc stretch        30"x3 30"x4 30"x4 30"x4   HEP education and instruction x10'                                         Ther Activity                                          Gait Training                                          Modalities              CP to L knee   x10'

## 2023-08-31 ENCOUNTER — OFFICE VISIT (OUTPATIENT)
Dept: PHYSICAL THERAPY | Facility: MEDICAL CENTER | Age: 38
End: 2023-08-31
Payer: COMMERCIAL

## 2023-08-31 DIAGNOSIS — Z98.890 S/P MEDIAL PATELLOFEMORAL LIGAMENT RECONSTRUCTION: Primary | ICD-10-CM

## 2023-08-31 DIAGNOSIS — Z87.39 S/P MEDIAL PATELLOFEMORAL LIGAMENT RECONSTRUCTION: Primary | ICD-10-CM

## 2023-08-31 DIAGNOSIS — M25.362 PATELLAR INSTABILITY OF LEFT KNEE: ICD-10-CM

## 2023-08-31 DIAGNOSIS — M22.02 RECURRENT DISLOCATION OF LEFT PATELLA: ICD-10-CM

## 2023-08-31 DIAGNOSIS — Z47.89 ORTHOPEDIC AFTERCARE: ICD-10-CM

## 2023-08-31 PROCEDURE — 97110 THERAPEUTIC EXERCISES: CPT | Performed by: PHYSICAL THERAPIST

## 2023-08-31 NOTE — PROGRESS NOTES
Daily Note     Today's date: 2023  Patient name: Maryanne Alejo  : 1985  MRN: [id number]  Referring provider: Bari Rodriguez DO  Dx:   Encounter Diagnosis     ICD-10-CM    1. S/P medial patellofemoral ligament reconstruction  Z98.890     Z87.39       2. Patellar instability of left knee  M25.362       3. Recurrent dislocation of left patella  M22.02       4. Orthopedic aftercare  Z47.89                      Subjective: Patient reports that she had some soreness in her knee and quad a few hours after last session that resolved by the next day. She continues to have some recurring discomfort in the front of her knee after sitting for long periods of time, but this resolves when she stretches. She is continuing to have some difficulty going down the stairs but is improving overall. Objective: See treatment diary below      Assessment: Patient continues to demonstrate L knee AROM WFL and comparable to the contralateral side. Held progressions for quad strengthening due to report of soreness after last session. Quad control continues to improve upon SLR. Able to progress reps for standing hip ABD, which demonstrates an improvement in proximal endurance. Patient tolerated treatment well and completed program without pain. She was educated to avoid periods of prolonged sitting in end-range L knee FLX positions when working to prevent stiffness with sitting. Patient would benefit from continued PT to progress strengthening per protocol to be able to negotiate stairs and return to PLOF. Plan: Continue per plan of care.       Precautions: s/p L MPFL reconstruction with gracilis allograft and lateral release (DOS: 23)- SEE PROTOCOL ATTACHED TO CHART      HEP: QS (towel knee/ankle), strap gastroc stretch, heel slides, sitting EOB, FLX SLR  Manuals 7/18 7/21 7/24 7/28 7/31 8/8 8/10 8/17 8/22 8/24 8/29 8/31   PROM      KP np                                                      Neuro Re-Ed Ther Ex               Rec bike        3' 5' 5' 5' 5'   QS 5"x20 towel knee HEP  5"x30 towel knee/  5"x30 towel ankle 5"x40 towel knee/5"x40 towel ankle 5"x50 towel knee/5"x50 towel ankle 5"x50 ea 5"x50 ea 5"x50 ea 5"x50 ea 5"x50 ea 5"x50 ea 5"x50 ea 5"x50 ea   Strap gastroc stretch 30"x4 HEP 30"x4 30"x4 30"x4 30"x4 30"x4 30"x4        Long sit HS stretch NV 30"x4 30"x4 30"x4 30"x4 30"x4 30"x4        Heel slides 5"x20 HEP 5"x30 5"x40 10"x30 10"x30 10"x30 10"x30 x30 x30 x30 x30 x30   Knee FLX AAROM edge of plinth   5"x20 using R LE 10"x20 using R LE 10"x30 using R LE HEP         SLR- 4-way NV in locked brace x10 ea 4-way in locked brace x15 ea 4-way in locked brace x7 FLX no brace (max A), x13 FLX/x20 3-way in locked brace 2x10 ea (no brace) 2x5 FLX, 2x10 ABD/ADD/EXT 3x10 ea 2x10 ea 1# 2x10 ea 1# 2x10 ea 1# 2x10 ea 2# 2x10 ea 2#   Heel raises      NV 5"x30 5"x30 5"x30 5"x30 5"x35 5"x35   Toe raises         5"x30 5"x30 5"x35 5"x35   Standing hip ABD         2x10 2x10 2x10 1# 2x15 1#   Standing HS curl        x30 x30 x30 x30 x35   SLS         10"x10 x10 to fatigue x10 to fatigue airex x10 to fatigue airex   Wall gastroc stretch        30"x3 30"x4 30"x4 30"x4    HEP education and instruction x10'                                            Ther Activity                                             Gait Training                                             Modalities               CP to L knee   x10'

## 2023-09-05 ENCOUNTER — OFFICE VISIT (OUTPATIENT)
Dept: PHYSICAL THERAPY | Facility: MEDICAL CENTER | Age: 38
End: 2023-09-05
Payer: COMMERCIAL

## 2023-09-05 DIAGNOSIS — M25.362 PATELLAR INSTABILITY OF LEFT KNEE: ICD-10-CM

## 2023-09-05 DIAGNOSIS — Z98.890 S/P MEDIAL PATELLOFEMORAL LIGAMENT RECONSTRUCTION: Primary | ICD-10-CM

## 2023-09-05 DIAGNOSIS — Z47.89 ORTHOPEDIC AFTERCARE: ICD-10-CM

## 2023-09-05 DIAGNOSIS — Z87.39 S/P MEDIAL PATELLOFEMORAL LIGAMENT RECONSTRUCTION: Primary | ICD-10-CM

## 2023-09-05 DIAGNOSIS — M22.02 RECURRENT DISLOCATION OF LEFT PATELLA: ICD-10-CM

## 2023-09-05 PROCEDURE — 97110 THERAPEUTIC EXERCISES: CPT | Performed by: PHYSICAL THERAPIST

## 2023-09-05 NOTE — PROGRESS NOTES
Daily Note     Today's date: 2023  Patient name: Wes Velasco  : 1985  MRN: [id number]  Referring provider: Munir Flowers DO  Dx:   Encounter Diagnosis     ICD-10-CM    1. S/P medial patellofemoral ligament reconstruction  Z98.890     Z87.39       2. Patellar instability of left knee  M25.362       3. Recurrent dislocation of left patella  M22.02       4. Orthopedic aftercare  Z47.89                      Subjective: Patient reports that her knee is continuing to improve, and she has not had any pain since last session. She continues to have some stiffness if she sits for long periods of time, but this is not as intense as previously. She is also now able to go up the stairs reciprocally. She continues to descend stairs with "step to" mechanics. Objective: See treatment diary below      Assessment: Patient continues to demonstrate L knee AROM WFL and comparable to the contralateral side. Able to progress reps for 4-way SLR, which demonstrates an improvement in quad and multiplanar hip strength. Also added prone QS to further improve quad strength. Able to perform heel raises to be performed unilaterally, which also demonstrates good progress toward goals. Patient demonstrated appropriate challenge with addition of SLS alphabet. She tolerated treatment well, demonstrated appropriate fatigue post-tx, and was able to complete program without pain. Patient would benefit from continued PT to progress strengthening program to be able to descend stairs and return to PLOF. Plan: Continue per plan of care.       Precautions: s/p L MPFL reconstruction with gracilis allograft and lateral release (DOS: 23)- SEE PROTOCOL ATTACHED TO CHART      HEP: QS (towel knee/ankle), strap gastroc stretch, heel slides, sitting EOB, FLX SLR  Manuals                      Neuro Re-Ed                                                Ther Ex      Rec bike 5' 5' 5'   QS 5"x50 ea 5"x50 ea 5"x50 ea long sit, 5"x30 prone   Heel slides x30 x30 HEP   SLR- 4-way 2x10 ea 2# 2x10 ea 2# 3x10 ea 2#   Heel raises 5"x35 5"x35 5"x20 SL   Toe raises 5"x35 5"x35 5"x35   Standing hip ABD 2x10 1# 2x15 1# 2x10 2#   Standing HS curl x30 x35 x35   SLS x10 to fatigue airex x10 to fatigue airex Alpha x1 no airex   Wall gastroc stretch 30"x4     HEP education and instruction                  Ther Activity                  Gait Training                  Modalities      CP to L knee

## 2023-09-07 ENCOUNTER — OFFICE VISIT (OUTPATIENT)
Dept: PHYSICAL THERAPY | Facility: MEDICAL CENTER | Age: 38
End: 2023-09-07
Payer: COMMERCIAL

## 2023-09-07 DIAGNOSIS — M25.362 PATELLAR INSTABILITY OF LEFT KNEE: ICD-10-CM

## 2023-09-07 DIAGNOSIS — Z87.39 S/P MEDIAL PATELLOFEMORAL LIGAMENT RECONSTRUCTION: Primary | ICD-10-CM

## 2023-09-07 DIAGNOSIS — Z98.890 S/P MEDIAL PATELLOFEMORAL LIGAMENT RECONSTRUCTION: Primary | ICD-10-CM

## 2023-09-07 DIAGNOSIS — M22.02 RECURRENT DISLOCATION OF LEFT PATELLA: ICD-10-CM

## 2023-09-07 DIAGNOSIS — Z47.89 ORTHOPEDIC AFTERCARE: ICD-10-CM

## 2023-09-07 PROCEDURE — 97110 THERAPEUTIC EXERCISES: CPT | Performed by: PHYSICAL THERAPIST

## 2023-09-07 NOTE — PROGRESS NOTES
Daily Note     Today's date: 2023  Patient name: Brandon Godinez  : 1985  MRN: [id number]  Referring provider: Aimee Dawn DO  Dx:   Encounter Diagnosis     ICD-10-CM    1. S/P medial patellofemoral ligament reconstruction  Z98.890     Z87.39       2. Patellar instability of left knee  M25.362       3. Recurrent dislocation of left patella  M22.02       4. Orthopedic aftercare  Z47.89                      Subjective: Patient reports that her knee is continuing to feel good, and she did not have any soreness after last session. She is able to go up the stairs, but she has some difficulty going down the stairs. Objective: See treatment diary below      Assessment: L knee AROM WFL and comparable to the contralateral side. Patient continues to demonstrate improved quad control upon SLR. Able to progress reps for standing hip ABD, which demonstrates an improvement in proximal endurance. She also was able to progress reps for SL heel raises, which demonstrates good progress toward goals. Patient tolerated treatment well, completed program without pain, and demonstrated appropriate fatigue post-tx. Patient would benefit from continued PT to progress strengthening to return to active lifestyle. Plan: Continue per plan of care.       Precautions: s/p L MPFL reconstruction with gracilis allograft and lateral release (DOS: 23)- SEE PROTOCOL ATTACHED TO CHART      HEP: QS (towel knee/ankle), strap gastroc stretch, heel slides, sitting EOB, FLX SLR  Manuals                         Neuro Re-Ed                                                        Ther Ex       Rec bike 5' 5' 5' 5'   QS 5"x50 ea 5"x50 ea 5"x50 ea long sit, 5"x30 prone 5"x50 ea long sit, 5"x30 prone   Heel slides x30 x30 HEP    SLR- 4-way 2x10 ea 2# 2x10 ea 2# 3x10 ea 2# 3x10 ea 2#   Heel raises 5"x35 5"x35 5"x20 SL 5"x30 SL   Toe raises 5"x35 5"x35 5"x35 5"x40   Standing hip ABD 2x10 1# 2x15 1# 2x10 2# 3x10 2# Standing HS curl x30 x35 x35 x40   SLS x10 to fatigue airex x10 to fatigue airex Alpha x1 no airex Alpha x1 no airex   Wall gastroc stretch 30"x4      HEP education and instruction                     Ther Activity                     Gait Training                     Modalities       CP to L knee

## 2023-09-12 ENCOUNTER — OFFICE VISIT (OUTPATIENT)
Dept: PHYSICAL THERAPY | Facility: MEDICAL CENTER | Age: 38
End: 2023-09-12
Payer: COMMERCIAL

## 2023-09-12 DIAGNOSIS — Z87.39 S/P MEDIAL PATELLOFEMORAL LIGAMENT RECONSTRUCTION: Primary | ICD-10-CM

## 2023-09-12 DIAGNOSIS — M22.02 RECURRENT DISLOCATION OF LEFT PATELLA: ICD-10-CM

## 2023-09-12 DIAGNOSIS — Z47.89 ORTHOPEDIC AFTERCARE: ICD-10-CM

## 2023-09-12 DIAGNOSIS — Z98.890 S/P MEDIAL PATELLOFEMORAL LIGAMENT RECONSTRUCTION: Primary | ICD-10-CM

## 2023-09-12 DIAGNOSIS — M25.362 PATELLAR INSTABILITY OF LEFT KNEE: ICD-10-CM

## 2023-09-12 PROCEDURE — 97110 THERAPEUTIC EXERCISES: CPT | Performed by: PHYSICAL THERAPIST

## 2023-09-12 NOTE — PROGRESS NOTES
Daily Note     Today's date: 2023  Patient name: Andreina Cleveland  : 1985  MRN: [id number]  Referring provider: Kristen Pineda DO  Dx:   Encounter Diagnosis     ICD-10-CM    1. S/P medial patellofemoral ligament reconstruction  Z98.890     Z87.39       2. Patellar instability of left knee  M25.362       3. Recurrent dislocation of left patella  M22.02       4. Orthopedic aftercare  Z47.89                      Subjective: Patient reports that she has been having some tightness and discomfort in her knee for the last 4 days. She reports that she did not increase her activity, but she thinks this discomfort is due to the weather. She also notes that she had some swelling in top/outside of her L knee yesterday. The swelling has improved today compared to yesterday, but she still feels that it is slightly swollen. She is not having any pain. Objective: See treatment diary below      Assessment: Patient demonstrated very mild edema in L distal quad but no significant swelling. L knee AROM remains WFL and comparable to the contralateral side. Returned to heel slides for edema control and added gentle patellar mobs for pain relief. Patient responded well to addition of patellar mobs with report of decreased tightness when ambulating post-tx. Decreased intensity of session today due to baseline soreness. Patient tolerated modified session well. She was educated to rest from strengthening exercises during HEP until next visit to allow soreness to subside. She reported further alleviation of tightness after MHP and reported no pain post-tx. Patient would benefit from continued PT      Plan: Continue per plan of care.       Precautions: s/p L MPFL reconstruction with gracilis allograft and lateral release (DOS: 23)- SEE PROTOCOL ATTACHED TO CHART      HEP: QS (towel knee/ankle), strap gastroc stretch, heel slides, sitting EOB, FLX SLR  Manuals    Gentle L patellar mobs     KP Gr. I-II sup/inf/med                   Neuro Re-Ed                                                                Ther Ex        Rec bike 5' 5' 5' 5' 5'   QS 5"x50 ea 5"x50 ea 5"x50 ea long sit, 5"x30 prone 5"x50 ea long sit, 5"x30 prone 5"x50 towel knee/5"x30 towel ankle   Heel slides x30 x30 HEP  x30   SLR- 4-way 2x10 ea 2# 2x10 ea 2# 3x10 ea 2# 3x10 ea 2# 2x10 ea no weight   Heel raises 5"x35 5"x35 5"x20 SL 5"x30 SL 5"x20 DL   Toe raises 5"x35 5"x35 5"x35 5"x40 5"x20   Standing hip ABD 2x10 1# 2x15 1# 2x10 2# 3x10 2# held   Standing HS curl x30 x35 x35 x40 held   SLS x10 to fatigue airex x10 to fatigue airex Alpha x1 no airex Alpha x1 no airex held   Wall gastroc stretch 30"x4       HEP education and instruction                        Ther Activity                        Gait Training                        Modalities        CP to L knee        MHP to L knee     x10'

## 2023-09-14 ENCOUNTER — OFFICE VISIT (OUTPATIENT)
Dept: PHYSICAL THERAPY | Facility: MEDICAL CENTER | Age: 38
End: 2023-09-14
Payer: COMMERCIAL

## 2023-09-14 DIAGNOSIS — Z98.890 S/P MEDIAL PATELLOFEMORAL LIGAMENT RECONSTRUCTION: Primary | ICD-10-CM

## 2023-09-14 DIAGNOSIS — Z47.89 ORTHOPEDIC AFTERCARE: ICD-10-CM

## 2023-09-14 DIAGNOSIS — M25.362 PATELLAR INSTABILITY OF LEFT KNEE: ICD-10-CM

## 2023-09-14 DIAGNOSIS — M22.02 RECURRENT DISLOCATION OF LEFT PATELLA: ICD-10-CM

## 2023-09-14 DIAGNOSIS — Z87.39 S/P MEDIAL PATELLOFEMORAL LIGAMENT RECONSTRUCTION: Primary | ICD-10-CM

## 2023-09-14 PROCEDURE — 97110 THERAPEUTIC EXERCISES: CPT | Performed by: PHYSICAL THERAPIST

## 2023-09-14 NOTE — PROGRESS NOTES
Daily Note     Today's date: 2023  Patient name: Aide Rasheed  : 1985  MRN: [id number]  Referring provider: Ike Alvares DO  Dx:   Encounter Diagnosis     ICD-10-CM    1. S/P medial patellofemoral ligament reconstruction  Z98.890     Z87.39       2. Patellar instability of left knee  M25.362       3. Recurrent dislocation of left patella  M22.02       4. Orthopedic aftercare  Z47.89                      Subjective: Patient reports that she felt relief after last session with less stiffness in her knee. The tightness returned by the following morning. However, she does note that the discomfort/tightness in her knee today is less intense compared to last session. She is not having any pain in her knee. Objective: See treatment diary below    L knee AROM: WFL and comparable to the contralateral side    Assessment: Continued to modify intensity of session due to baseline report of superior patellar stiffness/discomfort. Superior patellar discomfort was reproduced with L TKE. Added gentle prone quad stretching to improve muscular flexibility. Focused session on gentle quad/gastroc/HS stretching and quad/hip girdle activation, and patient tolerated her modified session well. Patient reported decreased intensity of tightness post-tx compared to presentation. She was educated to increase frequency of stretching when at home to further alleviate tightness. She was also educated to continue QS and SLR but to rest from CKC strengthening until discomfort subsides. Patient tolerated treatment well and completed program without pain. Patient would benefit from continued PT to progress treatment as appropriate to return to PLOF. Plan: Continue per plan of care.       Precautions: s/p L MPFL reconstruction with gracilis allograft and lateral release (DOS: 23)- SEE PROTOCOL ATTACHED TO CHART      HEP: QS (towel knee/ankle), strap gastroc stretch, heel slides, sitting EOB, FLX SLR  Manuals  8/31 9/5 9/7 9/12 9/14   Gentle L patellar mobs     KP Gr. I-II sup/inf/med np                     Neuro Re-Ed                                                                        Ther Ex         Rec bike 5' 5' 5' 5' 5' 5'   QS 5"x50 ea 5"x50 ea 5"x50 ea long sit, 5"x30 prone 5"x50 ea long sit, 5"x30 prone 5"x50 towel knee/5"x30 towel ankle 5"x30 ea towel knee/ankle   Heel slides x30 x30 HEP  x30 x30   Strap gastroc stretch      30"x4   Long sit HS stretch      30"x4   Prone quad stretch      30"x3   SLR- 4-way 2x10 ea 2# 2x10 ea 2# 3x10 ea 2# 3x10 ea 2# 2x10 ea no weight 2x10 ea no weight   Heel raises 5"x35 5"x35 5"x20 SL 5"x30 SL 5"x20 DL 5"x20 DL   Toe raises 5"x35 5"x35 5"x35 5"x40 5"x20 5"x20   Standing hip ABD 2x10 1# 2x15 1# 2x10 2# 3x10 2# held    Standing HS curl x30 x35 x35 x40 held    SLS x10 to fatigue airex x10 to fatigue airex Alpha x1 no airex Alpha x1 no airex held    Wall gastroc stretch 30"x4        HEP education and instruction                           Ther Activity                           Gait Training                           Modalities         CP to L knee         MHP to L knee     x10' def

## 2023-09-19 ENCOUNTER — OFFICE VISIT (OUTPATIENT)
Dept: PHYSICAL THERAPY | Facility: MEDICAL CENTER | Age: 38
End: 2023-09-19
Payer: COMMERCIAL

## 2023-09-19 DIAGNOSIS — Z47.89 ORTHOPEDIC AFTERCARE: ICD-10-CM

## 2023-09-19 DIAGNOSIS — Z87.39 S/P MEDIAL PATELLOFEMORAL LIGAMENT RECONSTRUCTION: Primary | ICD-10-CM

## 2023-09-19 DIAGNOSIS — M25.362 PATELLAR INSTABILITY OF LEFT KNEE: ICD-10-CM

## 2023-09-19 DIAGNOSIS — Z98.890 S/P MEDIAL PATELLOFEMORAL LIGAMENT RECONSTRUCTION: Primary | ICD-10-CM

## 2023-09-19 DIAGNOSIS — M22.02 RECURRENT DISLOCATION OF LEFT PATELLA: ICD-10-CM

## 2023-09-19 PROCEDURE — 97110 THERAPEUTIC EXERCISES: CPT | Performed by: PHYSICAL THERAPIST

## 2023-09-19 NOTE — PROGRESS NOTES
Daily Note     Today's date: 2023  Patient name: Wes Velasco  : 1985  MRN: [id number]  Referring provider: Munir Flowers DO  Dx:   Encounter Diagnosis     ICD-10-CM    1. S/P medial patellofemoral ligament reconstruction  Z98.890     Z87.39       2. Patellar instability of left knee  M25.362       3. Recurrent dislocation of left patella  M22.02       4. Orthopedic aftercare  Z47.89                      Subjective: Patient reports that she continues to have some stiffness in her knee, but she is not having any pain. This stiffness is not limiting her ability to walk or go up or down the stairs. She feels the stiffness in the front of her knee when she hyperextends. Objective: See treatment diary below    L knee AROM: WFL and comparable to the contralateral side    Assessment: Patient continues to demonstrate steady progress with improving L knee AROM and strength 12 weeks s/p L MPFL reconstruction. Held progressions to phase 3 rehab per protocol due to recent onset of patient report of patellofemoral stiffness/discomfort. She was able to return to resisted 4-way SLR today, which demonstrates good progress toward goals. In addition, able to return to standing hip ABD and standing HS curls, which further indicates that her CKC strength is improving. Patient tolerated treatment well. She reported mild tightness in her anterior knee post-tx but reported no pain during or after session. Patient would benefit from continued PT to progress treatment as tolerated to return to active lifestyle. Plan: Continue per plan of care.       Precautions: s/p L MPFL reconstruction with gracilis allograft and lateral release (DOS: 23)- SEE PROTOCOL ATTACHED TO CHART      HEP: QS (towel knee/ankle), strap gastroc stretch, heel slides, sitting EOB, FLX SLR  Manuals  9   Gentle L patellar mobs     KP Gr. I-II sup/inf/med np                        Neuro Re-Ed Ther Ex          Rec bike 5' 5' 5' 5' 5' 5' 5'   QS 5"x50 ea 5"x50 ea 5"x50 ea long sit, 5"x30 prone 5"x50 ea long sit, 5"x30 prone 5"x50 towel knee/5"x30 towel ankle 5"x30 ea towel knee/ankle 5"x40 ea towel knee/ankle   Heel slides x30 x30 HEP  x30 x30 HEP   Strap gastroc stretch      30"x4 HEP   Long sit HS stretch      30"x4 HEP   Prone quad stretch      30"x3 30"x4   SLR- 4-way 2x10 ea 2# 2x10 ea 2# 3x10 ea 2# 3x10 ea 2# 2x10 ea no weight 2x10 ea no weight 2x10 ea 1#   Heel raises 5"x35 5"x35 5"x20 SL 5"x30 SL 5"x20 DL 5"x20 DL 5"x20 SL   Toe raises 5"x35 5"x35 5"x35 5"x40 5"x20 5"x20 5"x30   Standing hip ABD 2x10 1# 2x15 1# 2x10 2# 3x10 2# held  2x10 ea no weight   Standing HS curl x30 x35 x35 x40 held  x30   SLS x10 to fatigue airex x10 to fatigue airex Alpha x1 no airex Alpha x1 no airex held  Alpha x1 no airex   Wall gastroc stretch 30"x4         HEP education and instruction                              Ther Activity                              Gait Training                              Modalities          CP to L knee          MHP to L knee     x10' def

## 2023-09-21 ENCOUNTER — OFFICE VISIT (OUTPATIENT)
Dept: PHYSICAL THERAPY | Facility: MEDICAL CENTER | Age: 38
End: 2023-09-21
Payer: COMMERCIAL

## 2023-09-21 DIAGNOSIS — Z98.890 S/P MEDIAL PATELLOFEMORAL LIGAMENT RECONSTRUCTION: Primary | ICD-10-CM

## 2023-09-21 DIAGNOSIS — Z87.39 S/P MEDIAL PATELLOFEMORAL LIGAMENT RECONSTRUCTION: Primary | ICD-10-CM

## 2023-09-21 DIAGNOSIS — Z47.89 ORTHOPEDIC AFTERCARE: ICD-10-CM

## 2023-09-21 DIAGNOSIS — M22.02 RECURRENT DISLOCATION OF LEFT PATELLA: ICD-10-CM

## 2023-09-21 DIAGNOSIS — M25.362 PATELLAR INSTABILITY OF LEFT KNEE: ICD-10-CM

## 2023-09-21 PROCEDURE — 97110 THERAPEUTIC EXERCISES: CPT | Performed by: PHYSICAL THERAPIST

## 2023-09-21 NOTE — PROGRESS NOTES
Progress Note     Today's date: 2023  Patient name: Tom Izaguirre  : 1985  MRN: [id number]  Referring provider: Maria Isabel Cantrell DO  Dx:   Encounter Diagnosis     ICD-10-CM    1. S/P medial patellofemoral ligament reconstruction  Z98.890     Z87.39       2. Patellar instability of left knee  M25.362       3. Recurrent dislocation of left patella  M22.02       4. Orthopedic aftercare  Z47.89                      Subjective: Patient reports that her knee is continuing to improve overall. She is able to go up her stairs and walk longer distances on hilly terrain without difficulty. She continues to have some discomfort in her kneecap when she hyperextends, but she is not having any pain. She continues to have the most difficulty with descending the stairs. She has a follow-up with her physician tomorrow (). Objective: See treatment diary below    L knee AROM: 0-140 deg    Knee strength:  FLX: L: 4-/5, R: 5/5  EXT: L: 4-/5, R: 5/5    Assessment: Patient continues to demonstrate steady progress with improving L knee AROM and strength 12+ weeks s/p L MPFL reconstruction. L knee AROM WFL and comparable to the contralateral side, which has improved her quality of gait mechanics when ambulating. She is also demonstrating steady progress with improving L quad/HS/hip girdle strength each session. Able to increase resistance for 4-way SLR and initiate mini squats today, which further demonstrates good progress toward goals. Although improved significantly over the last few weeks, patient continues to demonstrate strength deficits in her L quad/hip girdle when compared to the contralateral side. This limits her ability to descend stairs to her prior capacity. Patient is demonstrating good progress toward her goals. She tolerated treatment well today and completed program without pain.  Patient would benefit from continued PT to further progress strengthening to facilitate a full return to active lifestyle. Goals  ST. Patient will be independent in an illustrated HEP. - met  2. Patient will be able to perform a proper QS to demonstrate improved knee strength for ambulation.- met  LT. Patient will achieve L knee FLX AROM equal to the contralateral side by 6 weeks post-op (23) to improve quality of gait mechanics and stair/curb negotiation.- met  2. Patient will increase L quad and hamstring strength to 5/5 by time of discharge to return to active lifestyle. - in progress (improved)  3. Patient will be able to return to gym routine with modifications as necessary.- in progress   4. Patient will be able to manage symptoms independently. - in progress    Plan: Continue per POC. 2x/week tapering to 1x/week for the next 6 weeks to progress strengthening.      Precautions: s/p L MPFL reconstruction with gracilis allograft and lateral release (DOS: 23)- SEE PROTOCOL ATTACHED TO CHART      HEP: QS (towel knee/ankle), strap gastroc stretch, heel slides, sitting EOB, FLX SLR  Manuals  9   Gentle L patellar mobs     KP Gr. I-II sup/inf/med np                           Neuro Re-Ed                                                                                        Ther Ex           Rec bike 5' 5' 5' 5' 5' 5' 5' 5'   QS 5"x50 ea 5"x50 ea 5"x50 ea long sit, 5"x30 prone 5"x50 ea long sit, 5"x30 prone 5"x50 towel knee/5"x30 towel ankle 5"x30 ea towel knee/ankle 5"x40 ea towel knee/ankle 5"x40 ea towel knee/ankle   Heel slides x30 x30 HEP  x30 x30 HEP    Strap gastroc stretch      30"x4 HEP    Long sit HS stretch      30"x4 HEP    Prone quad stretch      30"x3 30"x4 30"x4   SLR- 4-way 2x10 ea 2# 2x10 ea 2# 3x10 ea 2# 3x10 ea 2# 2x10 ea no weight 2x10 ea no weight 2x10 ea 1# 2x10 ea 2#   Heel raises 5"x35 5"x35 5"x20 SL 5"x30 SL 5"x20 DL 5"x20 DL 5"x20 SL 5"x30 SL   Toe raises 5"x35 5"x35 5"x35 5"x40 5"x20 5"x20 5"x30 5"x30   Standing hip ABD 2x10 1# 2x15 1# 2x10 2# 3x10 2# held  2x10 ea no weight 3x10 ea   Standing HS curl x30 x35 x35 x40 held  x30 x30   SLS x10 to fatigue airex x10 to fatigue airex Alpha x1 no airex Alpha x1 no airex held  Alpha x1 no airex Alpha 2x no airex   Mini squats 0-45        2x10   Step ups        NV   Wall gastroc stretch 30"x4          HEP education and instruction                                 Ther Activity                                 Gait Training                                 Modalities           CP to L knee           MHP to L knee     x10' def

## 2023-09-22 ENCOUNTER — OFFICE VISIT (OUTPATIENT)
Dept: OBGYN CLINIC | Facility: MEDICAL CENTER | Age: 38
End: 2023-09-22

## 2023-09-22 VITALS
HEIGHT: 62 IN | BODY MASS INDEX: 32.57 KG/M2 | DIASTOLIC BLOOD PRESSURE: 84 MMHG | WEIGHT: 177 LBS | SYSTOLIC BLOOD PRESSURE: 128 MMHG | HEART RATE: 68 BPM

## 2023-09-22 DIAGNOSIS — M25.362 PATELLAR INSTABILITY OF LEFT KNEE: Primary | ICD-10-CM

## 2023-09-22 PROCEDURE — 99024 POSTOP FOLLOW-UP VISIT: CPT | Performed by: ORTHOPAEDIC SURGERY

## 2023-09-22 NOTE — PROGRESS NOTES
Knee Post Operative Visit     Assesment:     3 months s/p Surgical Arthroscopy of the left knee with MPFL reconstruction with allograft and lateral release performed on 6/27/23, doing well    Plan:    Post-Operative treatment:    Ice to knee for 20 minutes at least 1-2 times daily. PT as per post operative protocol. May transition into HEP as tolerated  OTC NSAIDS prn for pain. History of Present Illness: The patient is a 41 y.o. female who is being evaluated post operatively 6 weeks  status post above mentioned procedure. Since the prior visit, She reports significant improvement. Pain is well controlled. The patient is using ice to control swelling. She is doing PT. Has some tightness. No new injury. No instability. I have reviewed the past medical, surgical, social and family history, medications and allergies as documented in the EMR. Review of systems: ROS is negative other than that noted in the HPI. Constitutional: Negative for fatigue and fever. Physical Exam:    Blood pressure 128/84, pulse 68, height 5' 2" (1.575 m), weight 80.3 kg (177 lb). General/Constitutional: NAD, well developed, well nourished  HENT: Normocephalic, atraumatic  CV: Intact distal pulses, regular rate  Resp: No respiratory distress or labored breathing  Lymphatic: No lymphadenopathy palpated  Neuro: Alert and Oriented x 3, no focal deficits  Psych: Normal mood, normal affect, normal judgement, normal behavior  Skin: Warm, dry, no rashes, no erythema       Knee Exam (focused):                   RIGHT LEFT   ROM:   0-130 0-120   Palpation: Effusion negative minimal     MJL tenderness Negative Negative     LJL tenderness Negative Negative   Instability: Varus stable stable     Valgus stable stable   Special Tests: Lachman Negative Negative     Posterior drawer Negative Negative     Anterior drawer Negative Negative     Pivot shift not tested not tested     Dial not tested not tested   Patella: Palpation no tenderness no tenderness     Mobility 1/4 1/4     Apprehension Negative Negative   Other: Single leg 1/4 squat not tested not tested      Incisions show no erythema, no drainage    LE NV Exam: +2 DP/PT pulses bilaterally  Sensation intact to light touch L2-S1 bilaterally     Bilateral hip ROM demonstrates no pain actively or passively    No calf tenderness to palpation bilaterally    Scribe Attestation    I,:   am acting as a scribe while in the presence of the attending physician.:       I,:   personally performed the services described in this documentation    as scribed in my presence.:

## 2023-09-26 ENCOUNTER — OFFICE VISIT (OUTPATIENT)
Dept: PHYSICAL THERAPY | Facility: MEDICAL CENTER | Age: 38
End: 2023-09-26
Payer: COMMERCIAL

## 2023-09-26 DIAGNOSIS — Z87.39 S/P MEDIAL PATELLOFEMORAL LIGAMENT RECONSTRUCTION: Primary | ICD-10-CM

## 2023-09-26 DIAGNOSIS — M22.02 RECURRENT DISLOCATION OF LEFT PATELLA: ICD-10-CM

## 2023-09-26 DIAGNOSIS — Z47.89 ORTHOPEDIC AFTERCARE: ICD-10-CM

## 2023-09-26 DIAGNOSIS — Z98.890 S/P MEDIAL PATELLOFEMORAL LIGAMENT RECONSTRUCTION: Primary | ICD-10-CM

## 2023-09-26 DIAGNOSIS — M25.362 PATELLAR INSTABILITY OF LEFT KNEE: ICD-10-CM

## 2023-09-26 PROCEDURE — 97110 THERAPEUTIC EXERCISES: CPT | Performed by: PHYSICAL THERAPIST

## 2023-09-26 NOTE — PROGRESS NOTES
Daily Note     Today's date: 2023  Patient name: Luis Toscano  : 1985  MRN: [id number]  Referring provider: Flaquita Mccann DO  Dx:   Encounter Diagnosis     ICD-10-CM    1. S/P medial patellofemoral ligament reconstruction  Z98.890     Z87.39       2. Patellar instability of left knee  M25.362       3. Recurrent dislocation of left patella  M22.02       4. Orthopedic aftercare  Z47.89                      Subjective: Patient reports that her knee felt really good after last session with no soreness. She also was able to go down her stairs over the weekend with a "step over" pattern. She has some tightness in her knee today that she feels is related to work and sitting for long periods. She followed up with her physician last Friday (), and she reports that they are pleased with her progress. Objective: See treatment diary below      Assessment: Continued with L quad/HS/hip girdle strengthening program as outlined below. Able to progress reps for 4-way SLR, which demonstrates an improvement in multiplanar quad and hip girdle endurance. Also able to perform SLS alphabet on an unstable surface, which further demonstrates good progress. Patient reported decreased patellofemoral stiffness when performing mini squats today vs last session. Added step ups to further improve CKC strength. Patient tolerated treatment well and completed program without pain. Patient would benefit from continued PT to progress strengthening to return to active lifestyle. Plan: Continue per plan of care.       Precautions: s/p L MPFL reconstruction with gracilis allograft and lateral release (DOS: 23)- SEE PROTOCOL ATTACHED TO CHART      HEP: QS (towel knee/ankle), strap gastroc stretch, heel slides, sitting EOB, FLX SLR  Manuals  9/ 9   Gentle L patellar mobs     KP Gr. I-II sup/inf/med np                              Neuro Re-Ed Ther Ex            Rec bike 5' 5' 5' 5' 5' 5' 5' 5' 5'   QS 5"x50 ea 5"x50 ea 5"x50 ea long sit, 5"x30 prone 5"x50 ea long sit, 5"x30 prone 5"x50 towel knee/5"x30 towel ankle 5"x30 ea towel knee/ankle 5"x40 ea towel knee/ankle 5"x40 ea towel knee/ankle HEP   Heel slides x30 x30 HEP  x30 x30 HEP     Strap gastroc stretch      30"x4 HEP     Long sit HS stretch      30"x4 HEP     Prone quad stretch      30"x3 30"x4 30"x4 30"x4   SLR- 4-way 2x10 ea 2# 2x10 ea 2# 3x10 ea 2# 3x10 ea 2# 2x10 ea no weight 2x10 ea no weight 2x10 ea 1# 2x10 ea 2# 3x10 ea 2#   Heel raises 5"x35 5"x35 5"x20 SL 5"x30 SL 5"x20 DL 5"x20 DL 5"x20 SL 5"x30 SL 5"x30 SL   Toe raises 5"x35 5"x35 5"x35 5"x40 5"x20 5"x20 5"x30 5"x30 5"x30   Standing hip ABD 2x10 1# 2x15 1# 2x10 2# 3x10 2# held  2x10 ea no weight 3x10 ea 3x10 ea   Standing HS curl x30 x35 x35 x40 held  x30 x30 x30   SLS x10 to fatigue airex x10 to fatigue airex Alpha x1 no airex Alpha x1 no airex held  Alpha x1 no airex Alpha 2x no airex Alpha 2x airex   Mini squats 0-45        2x10 2x10   Step ups        NV 2x10 no riser   Wall gastroc stretch 30"x4           HEP education and instruction                                    Ther Activity                                    Gait Training                                    Modalities            CP to L knee            MHP to L knee     x10' def

## 2023-09-28 ENCOUNTER — APPOINTMENT (OUTPATIENT)
Dept: PHYSICAL THERAPY | Facility: MEDICAL CENTER | Age: 38
End: 2023-09-28
Payer: COMMERCIAL

## 2023-10-03 ENCOUNTER — OFFICE VISIT (OUTPATIENT)
Dept: PHYSICAL THERAPY | Facility: MEDICAL CENTER | Age: 38
End: 2023-10-03
Payer: COMMERCIAL

## 2023-10-03 DIAGNOSIS — Z87.39 S/P MEDIAL PATELLOFEMORAL LIGAMENT RECONSTRUCTION: Primary | ICD-10-CM

## 2023-10-03 DIAGNOSIS — Z98.890 S/P MEDIAL PATELLOFEMORAL LIGAMENT RECONSTRUCTION: Primary | ICD-10-CM

## 2023-10-03 DIAGNOSIS — M22.02 RECURRENT DISLOCATION OF LEFT PATELLA: ICD-10-CM

## 2023-10-03 DIAGNOSIS — Z47.89 ORTHOPEDIC AFTERCARE: ICD-10-CM

## 2023-10-03 DIAGNOSIS — M25.362 PATELLAR INSTABILITY OF LEFT KNEE: ICD-10-CM

## 2023-10-03 PROCEDURE — 97110 THERAPEUTIC EXERCISES: CPT | Performed by: PHYSICAL THERAPIST

## 2023-10-03 NOTE — PROGRESS NOTES
Daily Note     Today's date: 10/3/2023  Patient name: Daniel Carvalho  : 1985  MRN: [id number]  Referring provider: Jo Ann Woods DO  Dx:   Encounter Diagnosis     ICD-10-CM    1. S/P medial patellofemoral ligament reconstruction  Z98.890     Z87.39       2. Patellar instability of left knee  M25.362       3. Recurrent dislocation of left patella  M22.02       4. Orthopedic aftercare  Z47.89                      Subjective: Patient reports that her knee is continuing to feel improved, and she is able to go down the stairs with reciprocal mechanics more frequently. She has some tightness in her knee at times but is not having pain. Objective: See treatment diary below      Assessment: Able to progress resistance for 4-way SLR, which demonstrates an improvement in multiplanar quad and hip strength. Also able to add resistance to standing HS curls and standing hip ABD, which further demonstrates good progress toward goals. Also progressed height for step ups, which indicates that her CKC strength is improving. Patient tolerated treatment well and demonstrated appropriate L quad fatigue post-tx. She completed program in a pain-free range. Patient would benefit from continued PT to progress strengthening to be able to descend stairs and return to active lifestyle. Plan: Continue per plan of care.       Precautions: s/p L MPFL reconstruction with gracilis allograft and lateral release (DOS: 23)- SEE PROTOCOL ATTACHED TO CHART      HEP: QS (towel knee/ankle), strap gastroc stretch, heel slides, sitting EOB, FLX SLR  Manuals  9/ 9/7 9/12 9/14 9/19 9/21 9/26 10/3   Gentle L patellar mobs     KP Gr. I-II sup/inf/med np                                 Neuro Re-Ed                                                                                                        Ther Ex             Rec bike 5' 5' 5' 5' 5' 5' 5' 5' 5' 5'   QS 5"x50 ea 5"x50 ea 5"x50 ea long sit, 5"x30 prone 5"x50 ea long sit, 5"x30 prone 5"x50 towel knee/5"x30 towel ankle 5"x30 ea towel knee/ankle 5"x40 ea towel knee/ankle 5"x40 ea towel knee/ankle HEP    Heel slides x30 x30 HEP  x30 x30 HEP      Strap gastroc stretch      30"x4 HEP      Long sit HS stretch      30"x4 HEP      Prone quad stretch      30"x3 30"x4 30"x4 30"x4 30"x4   SLR- 4-way 2x10 ea 2# 2x10 ea 2# 3x10 ea 2# 3x10 ea 2# 2x10 ea no weight 2x10 ea no weight 2x10 ea 1# 2x10 ea 2# 3x10 ea 2# 2x10 ea 3#   Bridges          2x10   Heel raises 5"x35 5"x35 5"x20 SL 5"x30 SL 5"x20 DL 5"x20 DL 5"x20 SL 5"x30 SL 5"x30 SL 5"x30 SL   Toe raises 5"x35 5"x35 5"x35 5"x40 5"x20 5"x20 5"x30 5"x30 5"x30 5"x30   Standing hip ABD 2x10 1# 2x15 1# 2x10 2# 3x10 2# held  2x10 ea no weight 3x10 ea 3x10 ea 2x10 ea 1#   Standing HS curl x30 x35 x35 x40 held  x30 x30 x30 x30 1#   SLS x10 to fatigue airex x10 to fatigue airex Alpha x1 no airex Alpha x1 no airex held  Alpha x1 no airex Alpha 2x no airex Alpha 2x airex Alpha 2x airex   Mini squats 0-45        2x10 2x10 x30   Step ups        NV 2x10 no riser 2x10 L1   Leg press          NV 0-45   Wall gastroc stretch 30"x4            HEP education and instruction                                       Ther Activity                                       Gait Training                                       Modalities             CP to L knee             MHP to L knee     x10' def

## 2023-10-05 ENCOUNTER — OFFICE VISIT (OUTPATIENT)
Dept: PHYSICAL THERAPY | Facility: MEDICAL CENTER | Age: 38
End: 2023-10-05
Payer: COMMERCIAL

## 2023-10-05 DIAGNOSIS — Z98.890 S/P MEDIAL PATELLOFEMORAL LIGAMENT RECONSTRUCTION: Primary | ICD-10-CM

## 2023-10-05 DIAGNOSIS — Z47.89 ORTHOPEDIC AFTERCARE: ICD-10-CM

## 2023-10-05 DIAGNOSIS — Z87.39 S/P MEDIAL PATELLOFEMORAL LIGAMENT RECONSTRUCTION: Primary | ICD-10-CM

## 2023-10-05 DIAGNOSIS — M25.362 PATELLAR INSTABILITY OF LEFT KNEE: ICD-10-CM

## 2023-10-05 DIAGNOSIS — M22.02 RECURRENT DISLOCATION OF LEFT PATELLA: ICD-10-CM

## 2023-10-05 PROCEDURE — 97110 THERAPEUTIC EXERCISES: CPT | Performed by: PHYSICAL THERAPIST

## 2023-10-05 NOTE — PROGRESS NOTES
Daily Note     Today's date: 10/5/2023  Patient name: Carrington Rader  : 1985  MRN: [id number]  Referring provider: Jyotsna Benitez DO  Dx:   Encounter Diagnosis     ICD-10-CM    1. S/P medial patellofemoral ligament reconstruction  Z98.890     Z87.39       2. Patellar instability of left knee  M25.362       3. Recurrent dislocation of left patella  M22.02       4. Orthopedic aftercare  Z47.89                      Subjective: Patient reports that her knee has been feeling good since last session, and she did not have any soreness. She is able to go down the stairs with reciprocal mechanics more frequently. Objective: See treatment diary below      Assessment: Patient continues to demonstrate improved quad control upon SLR. Able to progress resistance for standing HS curls, which also indicates that her HS strength is improving. Also able to progress resistance for standing hip ABD, which demonstrates an improvement in hip girdle stability. Initiated leg press in a modified range (0-45 deg), and patient demonstrated good form with no valgus present. Patient tolerated treatment well, demonstrated appropriate fatigue post-tx, and completed program without pain. Patient would benefit from continued PT to progress knee and hip strengthening to facilitate a full return to active lifestyle. Plan: Continue per plan of care.       Precautions: s/p L MPFL reconstruction with gracilis allograft and lateral release (DOS: 23)- SEE PROTOCOL ATTACHED TO CHART      HEP: QS (towel knee/ankle), strap gastroc stretch, heel slides, sitting EOB, FLX SLR  Manuals 9/21 9/26 10/3 10   Gentle L patellar mobs                     Neuro Re-Ed                                                        Ther Ex       Rec bike 5' 5' 5' 5'   QS 5"x40 ea towel knee/ankle HEP     Prone quad stretch 30"x4 30"x4 30"x4 30"x4   SLR- 4-way 2x10 ea 2# 3x10 ea 2# 2x10 ea 3# 3x10 ea 3#   Bridges   2x10 3x10   Heel raises 5"x30 SL 5"x30 SL 5"x30 SL 5"x30 SL   Toe raises 5"x30 5"x30 5"x30 np   Standing hip ABD 3x10 ea 3x10 ea 2x10 ea 1# 2x10 ea 2#   Standing HS curl x30 x30 x30 1# x30 2#   SLS Alpha 2x no airex Alpha 2x airex Alpha 2x airex Alpha 2x airex   Mini squats 0-45 2x10 2x10 x30 x30   Step ups NV 2x10 no riser 2x10 L1 x20 L2   Leg press   NV 0-45 2x10 50# DL seat 6 (0-45 deg)   Wall gastroc stretch       HEP education and instruction                     Ther Activity                     Gait Training                     Modalities       CP to L knee       MHP to L knee

## 2023-10-10 ENCOUNTER — APPOINTMENT (OUTPATIENT)
Dept: PHYSICAL THERAPY | Facility: MEDICAL CENTER | Age: 38
End: 2023-10-10
Payer: COMMERCIAL

## 2023-10-12 ENCOUNTER — OFFICE VISIT (OUTPATIENT)
Dept: PHYSICAL THERAPY | Facility: MEDICAL CENTER | Age: 38
End: 2023-10-12
Payer: COMMERCIAL

## 2023-10-12 DIAGNOSIS — M22.02 RECURRENT DISLOCATION OF LEFT PATELLA: ICD-10-CM

## 2023-10-12 DIAGNOSIS — Z98.890 S/P MEDIAL PATELLOFEMORAL LIGAMENT RECONSTRUCTION: Primary | ICD-10-CM

## 2023-10-12 DIAGNOSIS — Z47.89 ORTHOPEDIC AFTERCARE: ICD-10-CM

## 2023-10-12 DIAGNOSIS — M25.362 PATELLAR INSTABILITY OF LEFT KNEE: ICD-10-CM

## 2023-10-12 DIAGNOSIS — Z87.39 S/P MEDIAL PATELLOFEMORAL LIGAMENT RECONSTRUCTION: Primary | ICD-10-CM

## 2023-10-12 PROCEDURE — 97110 THERAPEUTIC EXERCISES: CPT | Performed by: PHYSICAL THERAPIST

## 2023-10-12 NOTE — PROGRESS NOTES
Daily Note     Today's date: 10/12/2023  Patient name: Ehsan Corea  : 1985  MRN: [id number]  Referring provider: Sawyer Scales DO  Dx:   Encounter Diagnosis     ICD-10-CM    1. S/P medial patellofemoral ligament reconstruction  Z98.890     Z87.39       2. Patellar instability of left knee  M25.362       3. Recurrent dislocation of left patella  M22.02       4. Orthopedic aftercare  Z47.89                      Subjective: Patient reports that her knee is feeling pretty good overall. She is able to go up and down the stairs reciprocally. She reports that she has some tightness in the front of her knee over the past few days after sitting at work for long periods of time. This tightness improves on the weekends when she is doing more walking. Objective: See treatment diary below      Assessment: Patient continues to demonstrate L knee AROM WFL. Able to progress reps for 4-way SLR, which demonstrates an improvement in multiplanar quad endurance. Also added ABD/ADD resistance to bridges to further improve proximal stability and reduce stress on L knee during ADL. In addition, patient was able to progress reps for SL alpha, which demonstrates an improvement in L LE endurance and stability. Patient was educated to increase frequency of stretching during periods of prolonged sitting at work to reduce patellofemoral stiffness. Patient tolerated treatment well and completed program without pain. Patient would benefit from continued PT to progress strengthening to return to full participation in active lifestyle. Plan: Continue per plan of care.       Precautions: s/p L MPFL reconstruction with gracilis allograft and lateral release (DOS: 23)- SEE PROTOCOL ATTACHED TO CHART      HEP: QS (towel knee/ankle), strap gastroc stretch, heel slides, sitting EOB, FLX SLR  Manuals 9/21 9/26 10/3 10/5 10/12   Gentle L patellar mobs                        Neuro Re-Ed Ther Ex        Rec bike 5' 5' 5' 5' 5'   QS 5"x40 ea towel knee/ankle HEP      Prone quad stretch 30"x4 30"x4 30"x4 30"x4 30"x4   SLR- 4-way 2x10 ea 2# 3x10 ea 2# 2x10 ea 3# 3x10 ea 3# 3x12 ea 3#   Bridges   2x10 3x10 5"x20 + ball squeeze, 5"x20  ABD GTB   Heel raises 5"x30 SL 5"x30 SL 5"x30 SL 5"x30 SL    Toe raises 5"x30 5"x30 5"x30 np    Standing hip ABD 3x10 ea 3x10 ea 2x10 ea 1# 2x10 ea 2# 2x10 ea 3#   Standing HS curl x30 x30 x30 1# x30 2# X30 3#   SLS Alpha 2x no airex Alpha 2x airex Alpha 2x airex Alpha 2x airex Alph 3x airex   Mini squats 0-45 2x10 2x10 x30 x30 x30   Step ups NV 2x10 no riser 2x10 L1 x20 L2 X30 L2   Leg press   NV 0-45 2x10 50# DL seat 6 (0-45 deg) 3x10 50# DL seat 6 (0-45 deg)   HEP education and instruction                        Ther Activity                        Gait Training                        Modalities        CP to L knee        MHP to L knee

## 2023-10-17 ENCOUNTER — OFFICE VISIT (OUTPATIENT)
Dept: PHYSICAL THERAPY | Facility: MEDICAL CENTER | Age: 38
End: 2023-10-17
Payer: COMMERCIAL

## 2023-10-17 DIAGNOSIS — M25.362 PATELLAR INSTABILITY OF LEFT KNEE: ICD-10-CM

## 2023-10-17 DIAGNOSIS — Z47.89 ORTHOPEDIC AFTERCARE: ICD-10-CM

## 2023-10-17 DIAGNOSIS — M22.02 RECURRENT DISLOCATION OF LEFT PATELLA: ICD-10-CM

## 2023-10-17 DIAGNOSIS — Z87.39 S/P MEDIAL PATELLOFEMORAL LIGAMENT RECONSTRUCTION: Primary | ICD-10-CM

## 2023-10-17 DIAGNOSIS — Z98.890 S/P MEDIAL PATELLOFEMORAL LIGAMENT RECONSTRUCTION: Primary | ICD-10-CM

## 2023-10-17 PROCEDURE — 97110 THERAPEUTIC EXERCISES: CPT | Performed by: PHYSICAL THERAPIST

## 2023-10-17 NOTE — PROGRESS NOTES
Daily Note     Today's date: 10/17/2023  Patient name: Andreina Cleveland  : 1985  MRN: [id number]  Referring provider: Kristen Pineda DO  Dx:   Encounter Diagnosis     ICD-10-CM    1. S/P medial patellofemoral ligament reconstruction  Z98.890     Z87.39       2. Patellar instability of left knee  M25.362       3. Recurrent dislocation of left patella  M22.02       4. Orthopedic aftercare  Z47.89                      Subjective: Patient reports that her knee has been feeling good since last session with no soreness after her last visit. She has some tightness that is intermittent but is improved overall. Objective: See treatment diary below      Assessment: Patient continues to demonstrate steady progress with improving R knee strength 16 weeks s/p MPFL reconstruction. Able to progress reps for 4-way SLR, which demonstrates an improvement in quad and hip girdle endurance. Also able to progress mini squats to be performed at the wall with pball with good valgus control, which further demonstrates good progress. She also was able to increase resistance for leg press and height for step ups, which indicates improvements in CKC strength. Patient tolerated treatment well, demonstrated appropriate fatigue post-tx, and completed program without pain. Patient would benefit from continued PT to progress strengthening to return to full participation in active lifestyle. Plan: Continue per plan of care.       Precautions: s/p L MPFL reconstruction with gracilis allograft and lateral release (DOS: 23)- SEE PROTOCOL ATTACHED TO CHART        Manuals 9/21 9/26 10/3 10/5 10/12 10/17   Gentle L patellar mobs                           Neuro Re-Ed                                                                        Ther Ex         Rec bike 5' 5' 5' 5' 5' 5'   QS 5"x40 ea towel knee/ankle HEP       Prone quad stretch 30"x4 30"x4 30"x4 30"x4 30"x4 30"x4   SLR- 4-way 2x10 ea 2# 3x10 ea 2# 2x10 ea 3# 3x10 ea 3# 3x12 ea 3# 3x15 ea 3#   Bridges   2x10 3x10 5"x20 + ball squeeze, 5"x20  ABD GTB 5"x30 + ball squeeze, 5"x30 ABD GTB   Heel raises 5"x30 SL 5"x30 SL 5"x30 SL 5"x30 SL     Toe raises 5"x30 5"x30 5"x30 np     Standing hip ABD 3x10 ea 3x10 ea 2x10 ea 1# 2x10 ea 2# 2x10 ea 3# 3x10 ea 3#   Standing HS curl x30 x30 x30 1# x30 2# X30 3# X30 3#   SLS Alpha 2x no airex Alpha 2x airex Alpha 2x airex Alpha 2x airex Alpha 3x airex Alpha 3x airex   Mini squats 0-45 2x10 2x10 x30 x30 x30 2x10 wall ball   Step ups NV 2x10 no riser 2x10 L1 x20 L2 X30 L2 X10 12"   Leg press   NV 0-45 2x10 50# DL seat 6 (0-45 deg) 3x10 50# DL seat 6 (0-45 deg) 2x10 60# DL seat 6   HEP education and instruction                           Ther Activity                           Gait Training                           Modalities         CP to L knee         MHP to L knee

## 2023-10-19 ENCOUNTER — OFFICE VISIT (OUTPATIENT)
Dept: PHYSICAL THERAPY | Facility: MEDICAL CENTER | Age: 38
End: 2023-10-19
Payer: COMMERCIAL

## 2023-10-19 DIAGNOSIS — M25.362 PATELLAR INSTABILITY OF LEFT KNEE: ICD-10-CM

## 2023-10-19 DIAGNOSIS — Z98.890 S/P MEDIAL PATELLOFEMORAL LIGAMENT RECONSTRUCTION: Primary | ICD-10-CM

## 2023-10-19 DIAGNOSIS — M22.02 RECURRENT DISLOCATION OF LEFT PATELLA: ICD-10-CM

## 2023-10-19 DIAGNOSIS — Z87.39 S/P MEDIAL PATELLOFEMORAL LIGAMENT RECONSTRUCTION: Primary | ICD-10-CM

## 2023-10-19 DIAGNOSIS — Z47.89 ORTHOPEDIC AFTERCARE: ICD-10-CM

## 2023-10-19 PROCEDURE — 97110 THERAPEUTIC EXERCISES: CPT | Performed by: PHYSICAL THERAPIST

## 2023-10-19 NOTE — PROGRESS NOTES
Daily Note     Today's date: 10/19/2023  Patient name: Deejay Shukla  : 1985  MRN: [id number]  Referring provider: Kapil Benavidez DO  Dx:   Encounter Diagnosis     ICD-10-CM    1. S/P medial patellofemoral ligament reconstruction  Z98.890     Z87.39       2. Patellar instability of left knee  M25.362       3. Recurrent dislocation of left patella  M22.02       4. Orthopedic aftercare  Z47.89                      Subjective: Patient reports that she has occasional tightness at times in her knee, but it is much improved overall. She did not have any soreness after last session. She is able to go up and down the stairs without pain, and it is getting easier to squat to reach her cabinets. Objective: See treatment diary below      Assessment: Able to progress resistance for 4-way SLR, which demonstrates an improvement in multiplanar hip and quad strength. Also able to perform SLS without visual input, which demonstrates an improvement in L LE stability. Also able to progress reps for wall ball squats and resistance for leg press, which demonstrates an improvement in CKC strength and endurance. Patient tolerated treatment well and completed program without pain. Patient would benefit from continued PT to progress strengthening to return to full participation in gym routine and ADL without limitation. Plan: Continue per plan of care.       Precautions: s/p L MPFL reconstruction with gracilis allograft and lateral release (DOS: 23)- SEE PROTOCOL ATTACHED TO CHART        Manuals 9/21 9/26 10/3 10/5 10/12 10/17 10/19   Gentle L patellar mobs                              Neuro Re-Ed                                                                                Ther Ex          Rec bike 5' 5' 5' 5' 5' 5' 5'   QS 5"x40 ea towel knee/ankle HEP        Prone quad stretch 30"x4 30"x4 30"x4 30"x4 30"x4 30"x4 30"x4   SLR- 4-way 2x10 ea 2# 3x10 ea 2# 2x10 ea 3# 3x10 ea 3# 3x12 ea 3# 3x15 ea 3# 2x10 ea 4# Bridges   2x10 3x10 5"x20 + ball squeeze, 5"x20  ABD GTB 5"x30 + ball squeeze, 5"x30 ABD GTB 5"x30 + ball squeeze, 5"x30 ABD blue   Heel raises 5"x30 SL 5"x30 SL 5"x30 SL 5"x30 SL      Toe raises 5"x30 5"x30 5"x30 np      Standing hip ABD 3x10 ea 3x10 ea 2x10 ea 1# 2x10 ea 2# 2x10 ea 3# 3x10 ea 3# 3x12 ea 3#   Standing HS curl x30 x30 x30 1# x30 2# X30 3# X30 3# X35 3#   SLS Alpha 2x no airex Alpha 2x airex Alpha 2x airex Alpha 2x airex Alpha 3x airex Alpha 3x airex Alpha EC 2x no airex   Mini squats 0-45 2x10 2x10 x30 x30 x30 2x10 wall ball X30 wall ball   Step ups NV 2x10 no riser 2x10 L1 x20 L2 X30 L2 X10 12" X10 12"   Leg press   NV 0-45 2x10 50# DL seat 6 (0-45 deg) 3x10 50# DL seat 6 (0-45 deg) 2x10 60# DL seat 6 2x10 65# DL seat 6   HEP education and instruction                              Ther Activity                              Gait Training                              Modalities          CP to L knee          MHP to L knee

## 2023-10-24 ENCOUNTER — OFFICE VISIT (OUTPATIENT)
Dept: PHYSICAL THERAPY | Facility: MEDICAL CENTER | Age: 38
End: 2023-10-24
Payer: COMMERCIAL

## 2023-10-24 DIAGNOSIS — Z98.890 S/P MEDIAL PATELLOFEMORAL LIGAMENT RECONSTRUCTION: Primary | ICD-10-CM

## 2023-10-24 DIAGNOSIS — M25.362 PATELLAR INSTABILITY OF LEFT KNEE: ICD-10-CM

## 2023-10-24 DIAGNOSIS — M22.02 RECURRENT DISLOCATION OF LEFT PATELLA: ICD-10-CM

## 2023-10-24 DIAGNOSIS — Z47.89 ORTHOPEDIC AFTERCARE: ICD-10-CM

## 2023-10-24 DIAGNOSIS — Z87.39 S/P MEDIAL PATELLOFEMORAL LIGAMENT RECONSTRUCTION: Primary | ICD-10-CM

## 2023-10-24 PROCEDURE — 97110 THERAPEUTIC EXERCISES: CPT | Performed by: PHYSICAL THERAPIST

## 2023-10-24 NOTE — PROGRESS NOTES
Daily Note     Today's date: 10/24/2023  Patient name: Deejay Shukla  : 1985  MRN: [id number]  Referring provider: Kapil Benavidez DO  Dx:   Encounter Diagnosis     ICD-10-CM    1. S/P medial patellofemoral ligament reconstruction  Z98.890     Z87.39       2. Patellar instability of left knee  M25.362       3. Recurrent dislocation of left patella  M22.02       4. Orthopedic aftercare  Z47.89                      Subjective: Patient reports that she has occasional tightness in her knee, but this is not constant. Her knee is continuing to feel pretty good overall. She had some fatigue after last session but no soreness. Objective: See treatment diary below      Assessment: Patient continues to demonstrate good progress with all quad/hip girdle strengthening interventions. Able to progress reps for 4-way SLR, which demonstrates an improvement in multiplanar hip girdle and quad endurance. Also able to progress resistance for standing hip ABD, which further demonstrates an improvement in proximal strength. Patient demonstrated good form during ball squats and leg press with no valgus present. Patient tolerated treatment well and completed program without pain. Patient would benefit from continued PT to further progress strengthening to return to full active lifestyle and gym routine without limitation. Plan: Continue per plan of care.       Precautions: s/p L MPFL reconstruction with gracilis allograft and lateral release (DOS: 23)- SEE PROTOCOL ATTACHED TO CHART        Manuals 9/21 9/26 10/3 10/5 10/12 10/17 10/19 10/24   Gentle L patellar mobs                                 Neuro Re-Ed                                                                                        Ther Ex           Rec bike 5' 5' 5' 5' 5' 5' 5' 5'   QS 5"x40 ea towel knee/ankle HEP         Prone quad stretch 30"x4 30"x4 30"x4 30"x4 30"x4 30"x4 30"x4 30"x4   SLR- 4-way 2x10 ea 2# 3x10 ea 2# 2x10 ea 3# 3x10 ea 3# 3x12 ea 3# 3x15 ea 3# 2x10 ea 4# 3x10 ea 4#   Bridges   2x10 3x10 5"x20 + ball squeeze, 5"x20  ABD GTB 5"x30 + ball squeeze, 5"x30 ABD GTB 5"x30 + ball squeeze, 5"x30 ABD blue 5"x30 + ball squeeze, 5"x30 ABD blue   Heel raises 5"x30 SL 5"x30 SL 5"x30 SL 5"x30 SL       Toe raises 5"x30 5"x30 5"x30 np       Standing hip ABD 3x10 ea 3x10 ea 2x10 ea 1# 2x10 ea 2# 2x10 ea 3# 3x10 ea 3# 3x12 ea 3# 2x10 ea 4#   Standing HS curl x30 x30 x30 1# x30 2# X30 3# X30 3# X35 3# X30 4#   SLS Alpha 2x no airex Alpha 2x airex Alpha 2x airex Alpha 2x airex Alpha 3x airex Alpha 3x airex Alpha EC 2x no airex Alpha EC 2x no airex   Mini squats 0-45 2x10 2x10 x30 x30 x30 2x10 wall ball X30 wall ball X30 wall ball   Step ups NV 2x10 no riser 2x10 L1 x20 L2 X30 L2 X10 12" X10 12" X15 12"   Leg press   NV 0-45 2x10 50# DL seat 6 (0-45 deg) 3x10 50# DL seat 6 (0-45 deg) 2x10 60# DL seat 6 2x10 65# DL seat 6 2x10 70# DL seat 6   HEP education and instruction                                 Ther Activity                                 Gait Training                                 Modalities           CP to L knee           MHP to L knee

## 2023-10-26 ENCOUNTER — OFFICE VISIT (OUTPATIENT)
Dept: PHYSICAL THERAPY | Facility: MEDICAL CENTER | Age: 38
End: 2023-10-26
Payer: COMMERCIAL

## 2023-10-26 DIAGNOSIS — M22.02 RECURRENT DISLOCATION OF LEFT PATELLA: ICD-10-CM

## 2023-10-26 DIAGNOSIS — Z87.39 S/P MEDIAL PATELLOFEMORAL LIGAMENT RECONSTRUCTION: Primary | ICD-10-CM

## 2023-10-26 DIAGNOSIS — Z98.890 S/P MEDIAL PATELLOFEMORAL LIGAMENT RECONSTRUCTION: Primary | ICD-10-CM

## 2023-10-26 DIAGNOSIS — M25.362 PATELLAR INSTABILITY OF LEFT KNEE: ICD-10-CM

## 2023-10-26 DIAGNOSIS — Z47.89 ORTHOPEDIC AFTERCARE: ICD-10-CM

## 2023-10-26 PROCEDURE — 97110 THERAPEUTIC EXERCISES: CPT | Performed by: PHYSICAL THERAPIST

## 2023-10-26 NOTE — PROGRESS NOTES
Progress Note     Today's date: 10/26/2023  Patient name: Jorge Alberto Pérez  : 1985  MRN: [id number]  Referring provider: Perez Funes DO  Dx:   Encounter Diagnosis     ICD-10-CM    1. S/P medial patellofemoral ligament reconstruction  Z98.890     Z87.39       2. Patellar instability of left knee  M25.362       3. Recurrent dislocation of left patella  M22.02       4. Orthopedic aftercare  Z47.89                      Subjective: Patient reports that her knee is continuing to improve overall. She is able to walk, go up and down the stairs, and perform more of her household chores. She reports occasional tightness in the front of her knee at times, but this is not constant. She desires to continue to work on her strengthening to be able to return to her gym routine. She also has some discomfort when performing a deep squat. Objective: See treatment diary below    L knee AROM: WFL and comparable to the contralateral side    Knee strength:  FLX: L: 4/5, R: 5/5  EXT: L: 4/5, R: 5/5    Hip ABD strength:  L: 4+/5, R: 5/5    DL squat:   Weight shifting to R LE    SL squat:  L: mild valgus, decreased depth, R: WFL    Assessment: Patient has demonstrated excellent progress with improving L knee ROM and strength 17 weeks s/p L MPFL reconstruction and lateral release (DOS: 2023), which has facilitated an improvement in her ability to ambulate longer distances, negotiate stairs, and perform light household chores. Although improved significantly over the past few weeks, patient demonstrates mild strength deficits in her L quadriceps and hamstrings compared to the contralateral side. This limits her ability to participate in her full gym routine to her prior capacity. In addition, mild proximal strength deficits in her L hip girdle compared to the contralateral side contribute to compensatory stress on her L patellofemoral structures when performing deep squatting during ADL.  Patient is demonstrating good progress toward her goals and completed her program today with good technique and no pain. Due to good progress, plan to reduce frequency to 1x/week to further progress [facility] SL strengthening program to facilitate a full return to her active lifestyle. Goals  ST. Patient will be independent in an illustrated HEP. - met  2. Patient will be able to perform a proper QS to demonstrate improved knee strength for ambulation.- met  LT. Patient will achieve L knee FLX AROM equal to the contralateral side by 6 weeks post-op (23) to improve quality of gait mechanics and stair/curb negotiation.- met  2. Patient will increase L quad and hamstring strength to 5/5 by time of discharge to return to active lifestyle. - in progress (improved)  3. Patient will be able to return to gym routine with modifications as necessary.- in progress (improved)  4. Patient will be able to manage symptoms independently. - in progress        Plan: Due to good progress toward goals, plan to reduce frequency to 1x/week for 4 more weeks to progress strengthening to facilitate a safe return to gym routine.      Precautions: s/p L MPFL reconstruction with gracilis allograft and lateral release (DOS: 23)- SEE PROTOCOL ATTACHED TO CHART        Manuals 10/17 10/19 10/24 10/26                 Neuro Re-Ed                                                        Ther Ex       Rec bike 5' 5' 5' 5'   Prone quad stretch 30"x4 30"x4 30"x4 30"x4   SLR- 4-way 3x15 ea 3# 2x10 ea 4# 3x10 ea 4# 3x10 ea 4#   Bridges 5"x30 + ball squeeze, 5"x30 ABD GTB 5"x30 + ball squeeze, 5"x30 ABD blue 5"x30 + ball squeeze, 5"x30 ABD blue 5"x30 + ball squeeze, 5"x30 ABD blue   Standing hip ABD 3x10 ea 3# 3x12 ea 3# 2x10 ea 4# 3x10 ea 4#   Standing HS curl X30 3# X35 3# X30 4# X35 4#   SLS Alpha 3x airex Alpha EC 2x no airex Alpha EC 2x no airex Alpha EC 2x no airex   Mini squats 0-45 2x10 wall ball X30 wall ball X30 wall ball X20 wall ball 5#   Step ups X10 12" X10 12" X15 12" X15 12"   Leg press 2x10 60# DL seat 6 2x10 65# DL seat 6 2x10 70# DL seat 6 2x10 70# DL seat 6, 2x10 45# DL seat 6   HEP education and instruction                     Ther Activity                     Gait Training                     Modalities       CP to L knee       MHP to L knee

## 2023-10-31 ENCOUNTER — APPOINTMENT (OUTPATIENT)
Dept: PHYSICAL THERAPY | Facility: MEDICAL CENTER | Age: 38
End: 2023-10-31
Payer: COMMERCIAL

## 2023-11-02 ENCOUNTER — OFFICE VISIT (OUTPATIENT)
Dept: PHYSICAL THERAPY | Facility: MEDICAL CENTER | Age: 38
End: 2023-11-02
Payer: COMMERCIAL

## 2023-11-02 DIAGNOSIS — Z47.89 ORTHOPEDIC AFTERCARE: ICD-10-CM

## 2023-11-02 DIAGNOSIS — Z98.890 S/P MEDIAL PATELLOFEMORAL LIGAMENT RECONSTRUCTION: Primary | ICD-10-CM

## 2023-11-02 DIAGNOSIS — M22.02 RECURRENT DISLOCATION OF LEFT PATELLA: ICD-10-CM

## 2023-11-02 DIAGNOSIS — Z87.39 S/P MEDIAL PATELLOFEMORAL LIGAMENT RECONSTRUCTION: Primary | ICD-10-CM

## 2023-11-02 DIAGNOSIS — M25.362 PATELLAR INSTABILITY OF LEFT KNEE: ICD-10-CM

## 2023-11-02 PROCEDURE — 97110 THERAPEUTIC EXERCISES: CPT | Performed by: PHYSICAL THERAPIST

## 2023-11-02 NOTE — PROGRESS NOTES
Daily Note     Today's date: 2023  Patient name: Deejay Shukla  : 1985  MRN: [id number]  Referring provider: Kapil Benavidez DO  Dx:   Encounter Diagnosis     ICD-10-CM    1. S/P medial patellofemoral ligament reconstruction  Z98.890     Z87.39       2. Patellar instability of left knee  M25.362       3. Recurrent dislocation of left patella  M22.02       4. Orthopedic aftercare  Z47.89                      Subjective: Patient reports that she had some fatigue and heaviness after last session, but she did not have any pain. She notes that she continues to have some tightness in her knee at times, but this is intermittent. She is continually noticing improvements in her ability to do her daily activities, and she is feeling stronger. She has improved her ability to squat. Objective: See treatment diary below      Assessment: L knee AROM continues to be [facility] [place] and comparable to the contralateral side. Patient was educated to increase frequency of prone quad stretching during HEP to prevent recurrence of patellofemoral stiffness. Able to progress reps for 4-way SLR, which demonstrates an improvement in quad and hip girdle endurance. Patient is demonstrating good progress toward her goals. She demonstrated good technique throughout session with no valgus collapse present. Able to progress resistance for DL and SL leg press, which further demonstrates an improvement in strength. Patient tolerated treatment well and completed program without pain. Patient would benefit from continued PT to further improve L LE strength to facilitate a full return to gym routine and active lifestyle to prior capacity. Plan: Continue per plan of care.       Precautions: s/p L MPFL reconstruction with gracilis allograft and lateral release (DOS: 23)- SEE PROTOCOL ATTACHED TO CHART        Manuals 10/17 10/19 10/24 10/26 11/2                   Neuro Re-Ed Ther Ex        Rec bike 5' 5' 5' 5' 5'   Prone quad stretch 30"x4 30"x4 30"x4 30"x4 30"x4   SLR- 4-way 3x15 ea 3# 2x10 ea 4# 3x10 ea 4# 3x10 ea 4# 3x12 ea 4#   Bridges 5"x30 + ball squeeze, 5"x30 ABD GTB 5"x30 + ball squeeze, 5"x30 ABD blue 5"x30 + ball squeeze, 5"x30 ABD blue 5"x30 + ball squeeze, 5"x30 ABD blue 5"x30 + ball squeeze, 5"x30 ABD blue   Standing hip ABD 3x10 ea 3# 3x12 ea 3# 2x10 ea 4# 3x10 ea 4# 3x12 ea 4#   Standing HS curl X30 3# X35 3# X30 4# X35 4# X35 4#   SLS Alpha 3x airex Alpha EC 2x no airex Alpha EC 2x no airex Alpha EC 2x no airex Alpha EC 2x no airex   Mini squats 0-45 2x10 wall ball X30 wall ball X30 wall ball X20 wall ball 5# X30 wall ball 5#   Step ups X10 12" X10 12" X15 12" X15 12" X20 12"   Leg press 2x10 60# DL seat 6 2x10 65# DL seat 6 2x10 70# DL seat 6 2x10 70# DL seat 6, 2x10 45# DL seat 6 2x10 80# DL seat 5, 2x10 50# DL seat 5   HEP education and instruction                        Ther Activity                        Gait Training                        Modalities        CP to L knee        MHP to L knee

## 2023-11-07 ENCOUNTER — APPOINTMENT (OUTPATIENT)
Dept: PHYSICAL THERAPY | Facility: MEDICAL CENTER | Age: 38
End: 2023-11-07
Payer: COMMERCIAL

## 2023-11-09 ENCOUNTER — OFFICE VISIT (OUTPATIENT)
Dept: PHYSICAL THERAPY | Facility: MEDICAL CENTER | Age: 38
End: 2023-11-09
Payer: COMMERCIAL

## 2023-11-09 DIAGNOSIS — M25.362 PATELLAR INSTABILITY OF LEFT KNEE: ICD-10-CM

## 2023-11-09 DIAGNOSIS — Z47.89 ORTHOPEDIC AFTERCARE: ICD-10-CM

## 2023-11-09 DIAGNOSIS — Z98.890 S/P MEDIAL PATELLOFEMORAL LIGAMENT RECONSTRUCTION: Primary | ICD-10-CM

## 2023-11-09 DIAGNOSIS — Z87.39 S/P MEDIAL PATELLOFEMORAL LIGAMENT RECONSTRUCTION: Primary | ICD-10-CM

## 2023-11-09 DIAGNOSIS — M22.02 RECURRENT DISLOCATION OF LEFT PATELLA: ICD-10-CM

## 2023-11-09 PROCEDURE — 97110 THERAPEUTIC EXERCISES: CPT | Performed by: PHYSICAL THERAPIST

## 2023-11-09 NOTE — PROGRESS NOTES
Daily Note     Today's date: 2023  Patient name: Tylor Osborn  : 1985  MRN: [id number]  Referring provider: Mady Kirkpatrick DO  Dx:   Encounter Diagnosis     ICD-10-CM    1. S/P medial patellofemoral ligament reconstruction  Z98.890     Z87.39       2. Patellar instability of left knee  M25.362       3. Recurrent dislocation of left patella  M22.02       4. Orthopedic aftercare  Z47.89                      Subjective: Patient reports that her knee is continuing to feel improved, and she is able to go up and down her stairs without difficulty. She reports that she has some tightness after sitting for long periods at work, but the stretching helps reduce the tightness. She was able to go to the gym over the past weekend and did not have any pain. Objective: See treatment diary below      Assessment: Continued with quad/hip girdle strengthening program as outlined below. Patient was able to progress reps for 4-way SLR, which demonstrates an improvement in multiplanar quad endurance. Also able to progress reps for supine and standing hip ABD strengthening, which indicates improvements in proximal endurance as well. In addition, she was able to progress resistance for wall ball squats and leg press with no valgus present. Patient tolerated treatment well and completed program without pain. Patient would benefit from continued PT to further improve L LE strength to facilitate a full return to gym routine to prior capacity. Plan: Continue per plan of care.       Precautions: s/p L MPFL reconstruction with gracilis allograft and lateral release (DOS: 23)- SEE PROTOCOL ATTACHED TO CHART        Manuals 10/17 10/19 10/24 10/26 11/2 11/9                     Neuro Re-Ed                                                                        Ther Ex         Rec bike 5' 5' 5' 5' 5' 5'   Prone quad stretch 30"x4 30"x4 30"x4 30"x4 30"x4 30"x4   SLR- 4-way 3x15 ea 3# 2x10 ea 4# 3x10 ea 4# 3x10 ea 4# 3x12 ea 4# 3x15 ea 4#   Bridges 5"x30 + ball squeeze, 5"x30 ABD GTB 5"x30 + ball squeeze, 5"x30 ABD blue 5"x30 + ball squeeze, 5"x30 ABD blue 5"x30 + ball squeeze, 5"x30 ABD blue 5"x30 + ball squeeze, 5"x30 ABD blue 5"x35 + ball squeeze, 5"x35 ABD blue   Standing hip ABD 3x10 ea 3# 3x12 ea 3# 2x10 ea 4# 3x10 ea 4# 3x12 ea 4# 3x15 ea 4#   Standing HS curl X30 3# X35 3# X30 4# X35 4# X35 4# X30 5#   SLS Alpha 3x airex Alpha EC 2x no airex Alpha EC 2x no airex Alpha EC 2x no airex Alpha EC 2x no airex Alpha EC 3x no airex   Mini squats 0-45 2x10 wall ball X30 wall ball X30 wall ball X20 wall ball 5# X30 wall ball 5# X20 wall ball 10#   Step ups X10 12" X10 12" X15 12" X15 12" X20 12" X20 12"   Leg press 2x10 60# DL seat 6 2x10 65# DL seat 6 2x10 70# DL seat 6 2x10 70# DL seat 6, 2x10 45# DL seat 6 2x10 80# DL seat 5, 2x10 50# DL seat 5 2x10 90# DL seat 5, 2x10 70# SL seat 5   HEP education and instruction                           Ther Activity                           Gait Training                           Modalities         CP to L knee         MHP to L knee

## 2023-11-14 ENCOUNTER — APPOINTMENT (OUTPATIENT)
Dept: PHYSICAL THERAPY | Facility: MEDICAL CENTER | Age: 38
End: 2023-11-14
Payer: COMMERCIAL

## 2023-11-16 ENCOUNTER — OFFICE VISIT (OUTPATIENT)
Dept: PHYSICAL THERAPY | Facility: MEDICAL CENTER | Age: 38
End: 2023-11-16
Payer: COMMERCIAL

## 2023-11-16 DIAGNOSIS — M22.02 RECURRENT DISLOCATION OF LEFT PATELLA: ICD-10-CM

## 2023-11-16 DIAGNOSIS — Z87.39 S/P MEDIAL PATELLOFEMORAL LIGAMENT RECONSTRUCTION: Primary | ICD-10-CM

## 2023-11-16 DIAGNOSIS — M25.362 PATELLAR INSTABILITY OF LEFT KNEE: ICD-10-CM

## 2023-11-16 DIAGNOSIS — Z98.890 S/P MEDIAL PATELLOFEMORAL LIGAMENT RECONSTRUCTION: Primary | ICD-10-CM

## 2023-11-16 DIAGNOSIS — Z47.89 ORTHOPEDIC AFTERCARE: ICD-10-CM

## 2023-11-16 PROCEDURE — 97110 THERAPEUTIC EXERCISES: CPT | Performed by: PHYSICAL THERAPIST

## 2023-11-16 NOTE — PROGRESS NOTES
Daily Note     Today's date: 2023  Patient name: Ehsan Corea  : 1985  MRN: [id number]  Referring provider: Sawyer Scales DO  Dx:   Encounter Diagnosis     ICD-10-CM    1. S/P medial patellofemoral ligament reconstruction  Z98.890     Z87.39       2. Patellar instability of left knee  M25.362       3. Recurrent dislocation of left patella  M22.02       4. Orthopedic aftercare  Z47.89                      Subjective: Patient reports that her knee is continuing to feel improved. She has occasional tightness after sitting for long periods at work, but this is intermittent. She is able to go up and down her stairs without difficulty and has started to return to a gym routine. She has a follow-up with her physician this upcoming Monday (). Objective: See treatment diary below      L knee AROM: WFL and comparable to the contralateral side    Knee strength:  FLX: L: 5/5, R: 5/5  EXT: L: 4+/5, R: 5/5      Assessment: Patient is demonstrating excellent progress with improving L knee AROM and strength 4.5 months s/p L MPFL reconstruction (DOS: 23). L knee AROM is WFL and comparable to the contralateral side. She continues to demonstrate very mild strength deficits in her L quad compared to the R, but this is steadily improving each session. Able to progress reps for wall ball squats and step ups, which indicates that her CKC endurance is improving. She was also able to progress resistance for DL/SL leg press, which further indicates good progress towards goals. No valgus present throughout session. Patient was educated in an updated illustrated HEP for continued flexibility and progressive strengthening in the gym. Patient tolerated treatment well and completed program without pain. Patient would benefit from continued PT to maximize independence with HEP prior to upcoming d/c.        Plan: Potential discharge next visit.      Precautions: s/p L MPFL reconstruction with gracilis allograft and lateral release (DOS: 6/27/23)- SEE PROTOCOL ATTACHED TO CHART        Manuals 10/17 10/19 10/24 10/26 11/2 11/9 11/16                       Neuro Re-Ed                                                                                Ther Ex          Rec bike 5' 5' 5' 5' 5' 5' 5'   Prone quad stretch 30"x4 30"x4 30"x4 30"x4 30"x4 30"x4 30"x4   SLR- 4-way 3x15 ea 3# 2x10 ea 4# 3x10 ea 4# 3x10 ea 4# 3x12 ea 4# 3x15 ea 4# 2x10 ea 5#   Bridges 5"x30 + ball squeeze, 5"x30 ABD GTB 5"x30 + ball squeeze, 5"x30 ABD blue 5"x30 + ball squeeze, 5"x30 ABD blue 5"x30 + ball squeeze, 5"x30 ABD blue 5"x30 + ball squeeze, 5"x30 ABD blue 5"x35 + ball squeeze, 5"x35 ABD blue 5"x35 + ball squeeze, 5"x35 ABD blue   SL bridges       2x10 ea   Standing hip ABD 3x10 ea 3# 3x12 ea 3# 2x10 ea 4# 3x10 ea 4# 3x12 ea 4# 3x15 ea 4# 2x10 ea 5#   Standing HS curl X30 3# X35 3# X30 4# X35 4# X35 4# X30 5#    SLS Alpha 3x airex Alpha EC 2x no airex Alpha EC 2x no airex Alpha EC 2x no airex Alpha EC 2x no airex Alpha EC 3x no airex Alpha EC 3x no airex   Mini squats 0-45 2x10 wall ball X30 wall ball X30 wall ball X20 wall ball 5# X30 wall ball 5# X20 wall ball 10# X30 wall ball 10#   Step ups X10 12" X10 12" X15 12" X15 12" X20 12" X20 12" X30 12"   Leg press 2x10 60# DL seat 6 2x10 65# DL seat 6 2x10 70# DL seat 6 2x10 70# DL seat 6, 2x10 45# DL seat 6 2x10 80# DL seat 5, 2x10 50# DL seat 5 2x10 90# DL seat 5, 2x10 70# SL seat 5 2x10 100# DL seat 5, 75# SL seat 5   HEP education and instruction                              Ther Activity                              Gait Training                              Modalities          CP to L knee          MHP to L knee

## 2023-11-21 ENCOUNTER — OFFICE VISIT (OUTPATIENT)
Dept: PHYSICAL THERAPY | Facility: MEDICAL CENTER | Age: 38
End: 2023-11-21
Payer: COMMERCIAL

## 2023-11-21 DIAGNOSIS — Z47.89 ORTHOPEDIC AFTERCARE: ICD-10-CM

## 2023-11-21 DIAGNOSIS — Z98.890 S/P MEDIAL PATELLOFEMORAL LIGAMENT RECONSTRUCTION: Primary | ICD-10-CM

## 2023-11-21 DIAGNOSIS — M25.362 PATELLAR INSTABILITY OF LEFT KNEE: ICD-10-CM

## 2023-11-21 DIAGNOSIS — Z87.39 S/P MEDIAL PATELLOFEMORAL LIGAMENT RECONSTRUCTION: Primary | ICD-10-CM

## 2023-11-21 DIAGNOSIS — M22.02 RECURRENT DISLOCATION OF LEFT PATELLA: ICD-10-CM

## 2023-11-21 PROCEDURE — 97110 THERAPEUTIC EXERCISES: CPT | Performed by: PHYSICAL THERAPIST

## 2023-11-21 NOTE — PROGRESS NOTES
Discharge Summary     Today's date: 2023  Patient name: Maryanne Alejo  : 1985  MRN: [id number]  Referring provider: Bari Rodriguez DO  Dx:   Encounter Diagnosis     ICD-10-CM    1. S/P medial patellofemoral ligament reconstruction  Z98.890     Z87.39       2. Patellar instability of left knee  M25.362       3. Recurrent dislocation of left patella  M22.02       4. Orthopedic aftercare  Z47.89                      Subjective: Patient reports that her knee is much improved. She has very mild stiffness at times after sitting for prolonged periods at work, but this improves after doing her stretching exercises. She is able to walk long distances and go up and down her stairs without difficulty. She also has been starting to return to her gym routine. She is very pleased with her overall progress and has no remaining limitations at home. She feels ready to be discharged to her HEP. Objective: See treatment diary below    L knee AROM: WFL and comparable to the contralateral side    Knee strength:  FLX: L: 5/5, R: 5/5  EXT: L: 5/5, R: 5/5    Outcome measure:  FOTO: 38/100 on 23, 88/100 on 23    Assessment: Patient has demonstrated excellent progress with improving L knee AROM and strength nearly 5 months s/p L MPFL reconstruction (DOS: 23). L knee AROM is WFL and comparable to the contralateral side in all planes, which has improved her quality of gait mechanics and has restored her ability to ambulate long community distances to her prior capacity. L knee strength has also improved significantly, which has restored her ability to negotiate stairs and curbs and has facilitated a return to her gym routine. Patient has met all of her goals for PT, and she is independent in a comprehensive illustrated HEP for maintenance flexibility and continued strengthening in the gym and at home. Patient is agreeable to be discharged to her HEP. Goals  ST.  Patient will be independent in an illustrated HEP. - met  2. Patient will be able to perform a proper QS to demonstrate improved knee strength for ambulation.- met  LT. Patient will achieve L knee FLX AROM equal to the contralateral side by 6 weeks post-op (23) to improve quality of gait mechanics and stair/curb negotiation.- met  2. Patient will increase L quad and hamstring strength to 5/5 by time of discharge to return to active lifestyle. - met  3. Patient will be able to return to gym routine with modifications as necessary.- met  4. Patient will be able to manage symptoms independently. - met    Plan: Discharge to [facility].      Precautions: s/p L MPFL reconstruction with gracilis allograft and lateral release (DOS: 23)- SEE PROTOCOL ATTACHED TO CHART        Manuals 10/17 10/19 10/24 10/26 11/2 11/9 11/16 11/21                         Neuro Re-Ed                                                                                        Ther Ex           Rec bike 5' 5' 5' 5' 5' 5' 5' 5'   Prone quad stretch 30"x4 30"x4 30"x4 30"x4 30"x4 30"x4 30"x4 30"x4   SLR- 4-way 3x15 ea 3# 2x10 ea 4# 3x10 ea 4# 3x10 ea 4# 3x12 ea 4# 3x15 ea 4# 2x10 ea 5# 2x10 ea 5#   Bridges 5"x30 + ball squeeze, 5"x30 ABD GTB 5"x30 + ball squeeze, 5"x30 ABD blue 5"x30 + ball squeeze, 5"x30 ABD blue 5"x30 + ball squeeze, 5"x30 ABD blue 5"x30 + ball squeeze, 5"x30 ABD blue 5"x35 + ball squeeze, 5"x35 ABD blue 5"x35 + ball squeeze, 5"x35 ABD blue 5"x35 + ball squeeze, 5"x35 ABD blue   SL bridges       2x10 ea 2x10 ea   Standing hip ABD 3x10 ea 3# 3x12 ea 3# 2x10 ea 4# 3x10 ea 4# 3x12 ea 4# 3x15 ea 4# 2x10 ea 5# 2x10 ea 5#   Standing HS curl X30 3# X35 3# X30 4# X35 4# X35 4# X30 5#     SLS Alpha 3x airex Alpha EC 2x no airex Alpha EC 2x no airex Alpha EC 2x no airex Alpha EC 2x no airex Alpha EC 3x no airex Alpha EC 3x no airex Alpha EC 3x no airex   Mini squats 0-45 2x10 wall ball X30 wall ball X30 wall ball X20 wall ball 5# X30 wall ball 5# X20 wall ball 10# X30 wall ball 10# X30 wall ball 10#   Step ups X10 12" X10 12" X15 12" X15 12" X20 12" X20 12" X30 12" X30 12"   Leg press 2x10 60# DL seat 6 2x10 65# DL seat 6 2x10 70# DL seat 6 2x10 70# DL seat 6, 2x10 45# DL seat 6 2x10 80# DL seat 5, 2x10 50# DL seat 5 2x10 90# DL seat 5, 2x10 70# SL seat 5 2x10 100# DL seat 5, 75# SL seat 5 2x10 100# DL seat 5, 75# SL seat 5   HEP education and instruction                                 Ther Activity                                 Gait Training                                 Modalities           CP to L knee           MHP to L knee

## 2024-03-20 ENCOUNTER — TELEPHONE (OUTPATIENT)
Age: 39
End: 2024-03-20

## 2024-03-20 ENCOUNTER — OFFICE VISIT (OUTPATIENT)
Dept: FAMILY MEDICINE CLINIC | Facility: CLINIC | Age: 39
End: 2024-03-20
Payer: COMMERCIAL

## 2024-03-20 VITALS
OXYGEN SATURATION: 97 % | WEIGHT: 177.6 LBS | DIASTOLIC BLOOD PRESSURE: 80 MMHG | HEART RATE: 79 BPM | RESPIRATION RATE: 16 BRPM | TEMPERATURE: 98.6 F | SYSTOLIC BLOOD PRESSURE: 118 MMHG | BODY MASS INDEX: 32.68 KG/M2 | HEIGHT: 62 IN

## 2024-03-20 DIAGNOSIS — J02.9 SORE THROAT: ICD-10-CM

## 2024-03-20 DIAGNOSIS — J30.2 SEASONAL ALLERGIES: ICD-10-CM

## 2024-03-20 DIAGNOSIS — Z00.00 ANNUAL PHYSICAL EXAM: Primary | ICD-10-CM

## 2024-03-20 DIAGNOSIS — J06.9 VIRAL UPPER RESPIRATORY TRACT INFECTION: ICD-10-CM

## 2024-03-20 DIAGNOSIS — D50.0 IRON DEFICIENCY ANEMIA DUE TO CHRONIC BLOOD LOSS: ICD-10-CM

## 2024-03-20 DIAGNOSIS — Z13.6 SCREENING FOR CARDIOVASCULAR CONDITION: ICD-10-CM

## 2024-03-20 DIAGNOSIS — E55.9 VITAMIN D DEFICIENCY: ICD-10-CM

## 2024-03-20 DIAGNOSIS — E78.2 MIXED HYPERLIPIDEMIA: ICD-10-CM

## 2024-03-20 DIAGNOSIS — E66.09 CLASS 1 OBESITY DUE TO EXCESS CALORIES WITHOUT SERIOUS COMORBIDITY WITH BODY MASS INDEX (BMI) OF 32.0 TO 32.9 IN ADULT: ICD-10-CM

## 2024-03-20 PROBLEM — E66.811 CLASS 1 OBESITY DUE TO EXCESS CALORIES WITHOUT SERIOUS COMORBIDITY WITH BODY MASS INDEX (BMI) OF 32.0 TO 32.9 IN ADULT: Status: ACTIVE | Noted: 2021-10-22

## 2024-03-20 PROBLEM — S83.004D PATELLAR DISLOCATION, RIGHT, SUBSEQUENT ENCOUNTER: Status: RESOLVED | Noted: 2023-02-13 | Resolved: 2024-03-20

## 2024-03-20 PROCEDURE — 99214 OFFICE O/P EST MOD 30 MIN: CPT | Performed by: PHYSICIAN ASSISTANT

## 2024-03-20 PROCEDURE — 99395 PREV VISIT EST AGE 18-39: CPT | Performed by: PHYSICIAN ASSISTANT

## 2024-03-20 PROCEDURE — 87636 SARSCOV2 & INF A&B AMP PRB: CPT | Performed by: PHYSICIAN ASSISTANT

## 2024-03-20 NOTE — ASSESSMENT & PLAN NOTE
Lab Results   Component Value Date    CHOLESTEROL 219 (H) 10/29/2021     Lab Results   Component Value Date    HDL 41 10/29/2021     Lab Results   Component Value Date    TRIG 157 (H) 10/29/2021     Lab Results   Component Value Date    NONHDLC 178 10/29/2021     Lab Results   Component Value Date    LDLCALC 147 (H) 10/29/2021     Recommend low fat diet.

## 2024-03-20 NOTE — ASSESSMENT & PLAN NOTE
Lab Results   Component Value Date    WBC 5.25 06/17/2023    HGB 10.9 (L) 06/17/2023    HCT 36.5 06/17/2023    MCV 77 (L) 06/17/2023     06/17/2023     With heavy periods x 1 week, regular cycle. Repeat CBC with iron panel. Continue increase iron in diet for now.

## 2024-03-20 NOTE — ASSESSMENT & PLAN NOTE
20 lb intentional weight loss in the past 2 years. Continue with diet/exercise.     BMI Counseling: Body mass index is 32.48 kg/m². The BMI is above normal. Nutrition recommendations include reducing portion sizes, decreasing overall calorie intake, 3-5 servings of fruits/vegetables daily, and reducing fast food intake. Exercise recommendations include exercising 3-5 times per week and strength training exercises.

## 2024-03-20 NOTE — TELEPHONE ENCOUNTER
Pt c/o sore throat for about 1 week, last seen in practice 11/2021. Scheduled ovl for 3/20 with Gali.

## 2024-03-20 NOTE — PROGRESS NOTES
ADULT ANNUAL PHYSICAL  [facility] PRIMARY CARE [facility]    NAME: Claudette Huang  AGE: 39 y.o. SEX: female  : 1985     DATE: 3/20/2024     Assessment and Plan:     Problem List Items Addressed This Visit        Blood    Anemia     Lab Results   Component Value Date    WBC 5.25 2023    HGB 10.9 (L) 2023    HCT 36.5 2023    MCV 77 (L) 2023     2023     With heavy periods x 1 week, regular cycle. Repeat CBC with iron panel. Continue increase iron in diet for now.          Relevant Orders    CBC and differential    Iron Panel (Includes Ferritin, Iron Sat%, Iron, and TIBC)       Other    Class 1 obesity due to excess calories without serious comorbidity with body mass index (BMI) of 32.0 to 32.9 in adult     20 lb intentional weight loss in the past 2 years. Continue with diet/exercise.     BMI Counseling: Body mass index is 32.48 kg/m². The BMI is above normal. Nutrition recommendations include reducing portion sizes, decreasing overall calorie intake, 3-5 servings of fruits/vegetables daily, and reducing fast food intake. Exercise recommendations include exercising 3-5 times per week and strength training exercises.         Mixed hyperlipidemia     Lab Results   Component Value Date    CHOLESTEROL 219 (H) 10/29/2021     Lab Results   Component Value Date    HDL 41 10/29/2021     Lab Results   Component Value Date    TRIG 157 (H) 10/29/2021     Lab Results   Component Value Date    NONHDLC 178 10/29/2021     Lab Results   Component Value Date    LDLCALC 147 (H) 10/29/2021     Recommend low fat diet.          Relevant Orders    Comprehensive metabolic panel    Lipid panel    Seasonal allergies     Does not tolerate flonase, recommend continue Allegra daily.          Vitamin D deficiency     Not taking supplement, consider pending repeat lab.         Relevant Orders    Vitamin D 25 hydroxy   Other Visit Diagnoses      Annual physical exam    -  Primary    Relevant Orders    Ambulatory Referral to Obstetrics / Gynecology    Viral upper respiratory tract infection        x1 week with sore throat and nasal congestion, suspect viral URI, recommend continue advil cold/flu and follow-up if no improvement    Relevant Orders    Covid/Flu- Office Collect Normal    Sore throat        hx of tonsillectomy, with mild uvular swelling, recommend salt water gargles    Screening for cardiovascular condition        Relevant Orders    Comprehensive metabolic panel    Lipid panel    CBC and differential            Immunizations and preventive care screenings were discussed with patient today. Appropriate education was printed on patient's after visit summary.    Counseling:  Alcohol/drug use: discussed moderation in alcohol intake, the recommendations for healthy alcohol use, and avoidance of illicit drug use.  Dental Health: discussed importance of regular tooth brushing, flossing, and dental visits.  Injury prevention: discussed safety/seat belts, safety helmets, smoke detectors, carbon dioxide detectors, and smoking near bedding or upholstery.  Sexual health: discussed sexually transmitted diseases, partner selection, use of condoms, avoidance of unintended pregnancy, and contraceptive alternatives.  Exercise: the importance of regular exercise/physical activity was discussed. Recommend exercise 3-5 times per week for at least 30 minutes.          Return in about 1 year (around 3/20/2025) for Annual physical.     Chief Complaint:     Chief Complaint   Patient presents with   • Sore Throat     X one week, taking cold and flu med over the counter per pt   • Nasal Congestion     X one week      History of Present Illness:     Adult Annual Physical   Patient here for a comprehensive physical exam. The patient reports problems - cold like symptoms . Start Allegra for allergies. Taking OTC cold/flu med with minimal improvement. Feels the uvula in the  back of throat. 2 kids 7 and 14 years old. Working from home.     No smoking, social once a week or less, no drug use.     Diet and Physical Activity  Diet/Nutrition: well balanced diet.   Exercise: walking.      Depression Screening  PHQ-2/9 Depression Screening    Little interest or pleasure in doing things: 0 - not at all  Feeling down, depressed, or hopeless: 0 - not at all  PHQ-2 Score: 0  PHQ-2 Interpretation: Negative depression screen       General Health  Sleep: sleeps well. Works from home   Hearing: normal - bilateral.  Vision: no vision problems and wears glasses.   Dental: no dental visits for >1 year and brushes teeth twice daily.       /GYN Health  Follows with gynecology? no   Last menstrual period: March 5, 2024  Contraceptive method:  none .  History of STDs?: no.     Advanced Care Planning  Do you have an advanced directive? no  Do you have a durable medical power of ? no  ACP document given to the patient? no      Review of Systems:     Review of Systems   Constitutional:  Negative for chills (resolved) and fever.   HENT:  Positive for congestion, postnasal drip and sore throat. Negative for rhinorrhea, sinus pressure, sinus pain and trouble swallowing.    Eyes:  Negative for pain, discharge (resolved x 3 days with b/l eye crusting), redness and itching.   Respiratory:  Negative for cough, shortness of breath and wheezing.    Cardiovascular:  Negative for chest pain and palpitations.   Gastrointestinal:  Negative for abdominal pain.   Neurological:  Negative for headaches.      Past Medical History:     Past Medical History:   Diagnosis Date   • Allergic    • Anemia    • Depression     No medications   • Patellar dislocation, right, subsequent encounter       Past Surgical History:     Past Surgical History:   Procedure Laterality Date   • MI ARTHROSCOPY KNEE LATERAL RELEASE Left 6/27/2023    Procedure: ARTHROSCOPY KNEE;  Surgeon: Bryan Arriaga DO;  Location:  MAIN OR;  Service:  "Orthopedics   • LA RCNSTJ DISLC PATELLA W/XTNSR RELIGNMT&/MUSC RL Left 6/27/2023    Procedure: ARTHROSCOPIC REALIGNMENT PATELLA;  Surgeon: Bryan Arriaga DO;  Location:  MAIN OR;  Service: Orthopedics   • TONSILLECTOMY        Social History:     Social History     Socioeconomic History   • Marital status: /Civil Union     Spouse name: None   • Number of children: None   • Years of education: None   • Highest education level: None   Occupational History   • None   Tobacco Use   • Smoking status: Never   • Smokeless tobacco: Never   Vaping Use   • Vaping status: Never Used   Substance and Sexual Activity   • Alcohol use: Yes     Comment: socially 3 per month   • Drug use: Never   • Sexual activity: Yes     Partners: Male     Birth control/protection: None   Other Topics Concern   • None   Social History Narrative   • None     Social Determinants of Health     Financial Resource Strain: Not on file   Food Insecurity: Not on file   Transportation Needs: Not on file   Physical Activity: Not on file   Stress: Not on file   Social Connections: Not on file   Intimate Partner Violence: Not on file   Housing Stability: Not on file      Family History:     Family History   Problem Relation Age of Onset   • Hypertension Father       Current Medications:     Current Outpatient Medications   Medication Sig Dispense Refill   • Acetaminophen (TYLENOL PO) Take 1,000 mg by mouth every 8 (eight) hours as needed       No current facility-administered medications for this visit.      Allergies:     No Known Allergies   Physical Exam:     /80 (BP Location: Left arm, Patient Position: Sitting, Cuff Size: Standard)   Pulse 79   Temp 98.6 °F (37 °C) (Tympanic)   Resp 16   Ht 5' 2\" (1.575 m)   Wt 80.6 kg (177 lb 9.6 oz)   LMP 03/05/2024   SpO2 97%   BMI 32.48 kg/m²     Physical Exam  Vitals reviewed.   Constitutional:       Appearance: Normal appearance. She is obese.   HENT:      Head: Normocephalic and atraumatic. "      Right Ear: Tympanic membrane, ear canal and external ear normal.      Left Ear: Tympanic membrane, ear canal and external ear normal.      Nose: Congestion and rhinorrhea present.      Mouth/Throat:      Mouth: Mucous membranes are moist.      Pharynx: Uvula midline. Posterior oropharyngeal erythema and uvula swelling present. No oropharyngeal exudate.      Tonsils: No tonsillar exudate or tonsillar abscesses.      Comments: Tonsils surgically absent   Eyes:      Extraocular Movements: Extraocular movements intact.      Conjunctiva/sclera: Conjunctivae normal.      Pupils: Pupils are equal, round, and reactive to light.   Cardiovascular:      Rate and Rhythm: Normal rate and regular rhythm.      Pulses: Normal pulses.      Heart sounds: Normal heart sounds.   Pulmonary:      Effort: Pulmonary effort is normal.      Breath sounds: Normal breath sounds.   Lymphadenopathy:      Cervical: Cervical adenopathy present.      Right cervical: Superficial cervical adenopathy present.      Left cervical: Superficial cervical adenopathy present.   Neurological:      Mental Status: She is alert and oriented to person, place, and time. Mental status is at baseline.          Gali Rojo PA-C   [facility] CARE [facility]

## 2024-03-21 LAB
FLUAV RNA RESP QL NAA+PROBE: NEGATIVE
FLUBV RNA RESP QL NAA+PROBE: NEGATIVE
SARS-COV-2 RNA RESP QL NAA+PROBE: NEGATIVE

## 2024-03-23 ENCOUNTER — APPOINTMENT (OUTPATIENT)
Dept: LAB | Facility: HOSPITAL | Age: 39
End: 2024-03-23
Payer: COMMERCIAL

## 2024-03-23 DIAGNOSIS — E78.2 MIXED HYPERLIPIDEMIA: ICD-10-CM

## 2024-03-23 DIAGNOSIS — Z13.6 SCREENING FOR CARDIOVASCULAR CONDITION: ICD-10-CM

## 2024-03-23 DIAGNOSIS — D50.0 IRON DEFICIENCY ANEMIA DUE TO CHRONIC BLOOD LOSS: ICD-10-CM

## 2024-03-23 DIAGNOSIS — E55.9 VITAMIN D DEFICIENCY: ICD-10-CM

## 2024-03-23 LAB
25(OH)D3 SERPL-MCNC: 22.6 NG/ML (ref 30–100)
ALBUMIN SERPL BCP-MCNC: 4.3 G/DL (ref 3.5–5)
ALP SERPL-CCNC: 75 U/L (ref 34–104)
ALT SERPL W P-5'-P-CCNC: 7 U/L (ref 7–52)
ANION GAP SERPL CALCULATED.3IONS-SCNC: 9 MMOL/L (ref 4–13)
AST SERPL W P-5'-P-CCNC: 12 U/L (ref 13–39)
BASOPHILS # BLD AUTO: 0.04 THOUSANDS/ÂΜL (ref 0–0.1)
BASOPHILS NFR BLD AUTO: 1 % (ref 0–1)
BILIRUB SERPL-MCNC: 0.35 MG/DL (ref 0.2–1)
BUN SERPL-MCNC: 11 MG/DL (ref 5–25)
CALCIUM SERPL-MCNC: 9.4 MG/DL (ref 8.4–10.2)
CHLORIDE SERPL-SCNC: 103 MMOL/L (ref 96–108)
CHOLEST SERPL-MCNC: 192 MG/DL
CO2 SERPL-SCNC: 26 MMOL/L (ref 21–32)
CREAT SERPL-MCNC: 0.58 MG/DL (ref 0.6–1.3)
EOSINOPHIL # BLD AUTO: 0.19 THOUSAND/ÂΜL (ref 0–0.61)
EOSINOPHIL NFR BLD AUTO: 4 % (ref 0–6)
ERYTHROCYTE [DISTWIDTH] IN BLOOD BY AUTOMATED COUNT: 17.9 % (ref 11.6–15.1)
FERRITIN SERPL-MCNC: 2 NG/ML (ref 11–307)
GFR SERPL CREATININE-BSD FRML MDRD: 117 ML/MIN/1.73SQ M
GLUCOSE P FAST SERPL-MCNC: 77 MG/DL (ref 65–99)
HCT VFR BLD AUTO: 31.9 % (ref 34.8–46.1)
HDLC SERPL-MCNC: 45 MG/DL
HGB BLD-MCNC: 9.4 G/DL (ref 11.5–15.4)
IMM GRANULOCYTES # BLD AUTO: 0.01 THOUSAND/UL (ref 0–0.2)
IMM GRANULOCYTES NFR BLD AUTO: 0 % (ref 0–2)
IRON SATN MFR SERPL: 3 % (ref 15–50)
IRON SERPL-MCNC: 15 UG/DL (ref 50–212)
LDLC SERPL CALC-MCNC: 118 MG/DL (ref 0–100)
LYMPHOCYTES # BLD AUTO: 2.59 THOUSANDS/ÂΜL (ref 0.6–4.47)
LYMPHOCYTES NFR BLD AUTO: 58 % (ref 14–44)
MCH RBC QN AUTO: 20.8 PG (ref 26.8–34.3)
MCHC RBC AUTO-ENTMCNC: 29.5 G/DL (ref 31.4–37.4)
MCV RBC AUTO: 71 FL (ref 82–98)
MONOCYTES # BLD AUTO: 0.38 THOUSAND/ÂΜL (ref 0.17–1.22)
MONOCYTES NFR BLD AUTO: 8 % (ref 4–12)
NEUTROPHILS # BLD AUTO: 1.32 THOUSANDS/ÂΜL (ref 1.85–7.62)
NEUTS SEG NFR BLD AUTO: 29 % (ref 43–75)
NONHDLC SERPL-MCNC: 147 MG/DL
NRBC BLD AUTO-RTO: 0 /100 WBCS
PLATELET # BLD AUTO: 357 THOUSANDS/UL (ref 149–390)
PMV BLD AUTO: 11.4 FL (ref 8.9–12.7)
POTASSIUM SERPL-SCNC: 4.4 MMOL/L (ref 3.5–5.3)
PROT SERPL-MCNC: 7.4 G/DL (ref 6.4–8.4)
RBC # BLD AUTO: 4.51 MILLION/UL (ref 3.81–5.12)
SODIUM SERPL-SCNC: 138 MMOL/L (ref 135–147)
TIBC SERPL-MCNC: 450 UG/DL (ref 250–450)
TRIGL SERPL-MCNC: 146 MG/DL
UIBC SERPL-MCNC: 435 UG/DL (ref 155–355)
WBC # BLD AUTO: 4.53 THOUSAND/UL (ref 4.31–10.16)

## 2024-03-23 PROCEDURE — 83550 IRON BINDING TEST: CPT

## 2024-03-23 PROCEDURE — 82728 ASSAY OF FERRITIN: CPT

## 2024-03-23 PROCEDURE — 80061 LIPID PANEL: CPT

## 2024-03-23 PROCEDURE — 80053 COMPREHEN METABOLIC PANEL: CPT

## 2024-03-23 PROCEDURE — 83540 ASSAY OF IRON: CPT

## 2024-03-23 PROCEDURE — 82306 VITAMIN D 25 HYDROXY: CPT

## 2024-03-23 PROCEDURE — 85025 COMPLETE CBC W/AUTO DIFF WBC: CPT

## 2024-03-23 PROCEDURE — 36415 COLL VENOUS BLD VENIPUNCTURE: CPT

## 2024-03-26 DIAGNOSIS — D50.0 IRON DEFICIENCY ANEMIA DUE TO CHRONIC BLOOD LOSS: Primary | ICD-10-CM

## 2024-08-12 ENCOUNTER — OFFICE VISIT (OUTPATIENT)
Dept: FAMILY MEDICINE CLINIC | Facility: CLINIC | Age: 39
End: 2024-08-12
Payer: COMMERCIAL

## 2024-08-12 VITALS
SYSTOLIC BLOOD PRESSURE: 118 MMHG | WEIGHT: 187.8 LBS | OXYGEN SATURATION: 98 % | BODY MASS INDEX: 34.56 KG/M2 | DIASTOLIC BLOOD PRESSURE: 80 MMHG | HEART RATE: 84 BPM | HEIGHT: 62 IN | RESPIRATION RATE: 16 BRPM | TEMPERATURE: 96.9 F

## 2024-08-12 DIAGNOSIS — E66.09 CLASS 1 OBESITY DUE TO EXCESS CALORIES WITHOUT SERIOUS COMORBIDITY WITH BODY MASS INDEX (BMI) OF 34.0 TO 34.9 IN ADULT: ICD-10-CM

## 2024-08-12 DIAGNOSIS — H10.31 ACUTE BACTERIAL CONJUNCTIVITIS OF RIGHT EYE: Primary | ICD-10-CM

## 2024-08-12 DIAGNOSIS — D50.0 IRON DEFICIENCY ANEMIA DUE TO CHRONIC BLOOD LOSS: ICD-10-CM

## 2024-08-12 DIAGNOSIS — H65.02 NON-RECURRENT ACUTE SEROUS OTITIS MEDIA OF LEFT EAR: ICD-10-CM

## 2024-08-12 DIAGNOSIS — E55.9 VITAMIN D DEFICIENCY: ICD-10-CM

## 2024-08-12 PROCEDURE — 99214 OFFICE O/P EST MOD 30 MIN: CPT | Performed by: PHYSICIAN ASSISTANT

## 2024-08-12 RX ORDER — ACETAMINOPHEN 160 MG
2000 TABLET,DISINTEGRATING ORAL DAILY
Qty: 90 CAPSULE | Refills: 3 | Status: SHIPPED | OUTPATIENT
Start: 2024-08-12 | End: 2025-08-07

## 2024-08-12 RX ORDER — AMOXICILLIN 875 MG
875 TABLET ORAL 2 TIMES DAILY
Qty: 10 TABLET | Refills: 0 | Status: SHIPPED | OUTPATIENT
Start: 2024-08-12 | End: 2024-08-17

## 2024-08-12 RX ORDER — POLYMYXIN B SULFATE AND TRIMETHOPRIM 1; 10000 MG/ML; [USP'U]/ML
1 SOLUTION OPHTHALMIC EVERY 4 HOURS
Qty: 10 ML | Refills: 0 | Status: SHIPPED | OUTPATIENT
Start: 2024-08-12

## 2024-08-12 NOTE — ASSESSMENT & PLAN NOTE
Lab Results   Component Value Date    WBC 4.53 03/23/2024    HGB 9.4 (L) 03/23/2024    HCT 31.9 (L) 03/23/2024    MCV 71 (L) 03/23/2024     03/23/2024     Lab Results   Component Value Date    IRON 15 (L) 03/23/2024    TIBC 450 03/23/2024    FERRITIN 2 (L) 03/23/2024     Not compliant with iron supplement, with heavy periods monthly, encouraged iron supplement every other day and iron rich diet. Repeat labs.

## 2024-08-12 NOTE — PROGRESS NOTES
Ambulatory Visit  Name: Claudette Huang      : 1985      MRN: [id number]  Encounter Provider: Gali Rojo PA-C  Encounter Date: 2024   Encounter department: [facility] CARE [facility]    Assessment & Plan   1. Acute bacterial conjunctivitis of right eye  Comments:  Unilateral right eye conjunctivitis, with crusting/erythema today, start Polytrim eyedrops every 4 hours x 3 days, no change visual acuity, wears contacts  Orders:  -     polymyxin b-trimethoprim (POLYTRIM) ophthalmic solution; Administer 1 drop to the right eye every 4 (four) hours  2. Non-recurrent acute serous otitis media of left ear  Comments:  L ear x 1 week, start empiric amoxicillin twice daily x 5 days, follow-up if no improvement  Orders:  -     amoxicillin (AMOXIL) 875 mg tablet; Take 1 tablet (875 mg total) by mouth 2 (two) times a day for 5 days  3. Vitamin D deficiency  Assessment & Plan:  Not taking supplement, recommend restart vitamin D3 2000IU daily.       Ref Range & Units 3/23/24  9:56 AM 10/29/21  8:31 AM   Vit D, 25-Hydroxy  30.0 - 100.0 ng/mL 22.6 Low  22.5 Low      Orders:  -     Cholecalciferol (Vitamin D3) 50 MCG (2000 UT) capsule; Take 1 capsule (2,000 Units total) by mouth daily  4. Iron deficiency anemia due to chronic blood loss  Assessment & Plan:  Lab Results   Component Value Date    WBC 4.53 2024    HGB 9.4 (L) 2024    HCT 31.9 (L) 2024    MCV 71 (L) 2024     2024     Lab Results   Component Value Date    IRON 15 (L) 2024    TIBC 450 2024    FERRITIN 2 (L) 2024     Not compliant with iron supplement, with heavy periods monthly, encouraged iron supplement every other day and iron rich diet. Repeat labs.   Orders:  -     CBC and differential; Future; Expected date: 2024  5. Class 1 obesity due to excess calories without serious comorbidity with body mass index (BMI) of 34.0 to 34.9 in adult  Assessment & Plan:  Recommend  "diet/exercise.        History of Present Illness     Claudette is a 39 y.o. female who presents with R eye crusting/redness and L ear pain and discharge. Discharge was over a week ago, was initially clear fluid then a little yellow crusted came out. Kids were sick and  had URI as well. Initially started on 8/3 with 101F max for 4 days of fever and sore throat, headache, cough. Most of those symptoms resolved. Woke up today with crusty eye and has been having L ear pain. No swimming pool since early July.     LMP 7/21/24    Earache   There is pain in the left ear. This is a new problem. The current episode started in the past 7 days. The problem occurs constantly. The problem has been unchanged. The maximum temperature recorded prior to her arrival was 101 - 101.9 F. The fever has been present for 3 to 4 days. The pain is at a severity of 7/10. Associated symptoms include coughing, ear discharge, headaches, neck pain and a sore throat. Pertinent negatives include no abdominal pain, diarrhea, rash, rhinorrhea or vomiting.       Review of Systems   Constitutional:  Negative for chills and fever.   HENT:  Positive for ear discharge, ear pain and sore throat. Negative for rhinorrhea.    Eyes:  Positive for discharge and redness. Negative for photophobia, pain and visual disturbance.   Respiratory:  Positive for cough.    Gastrointestinal:  Negative for abdominal pain, diarrhea and vomiting.   Musculoskeletal:  Positive for neck pain.   Skin:  Negative for rash.   Neurological:  Positive for headaches.       Objective     /80 (BP Location: Left arm, Patient Position: Sitting, Cuff Size: Standard)   Pulse 84   Temp (!) 96.9 °F (36.1 °C) (Tympanic)   Resp 16   Ht 5' 2\" (1.575 m)   Wt 85.2 kg (187 lb 12.8 oz)   SpO2 98%   BMI 34.35 kg/m²     Physical Exam  Vitals reviewed.   Constitutional:       Appearance: Normal appearance.   HENT:      Head: Normocephalic and atraumatic.      Right Ear: Tympanic " membrane, ear canal and external ear normal.      Left Ear: No drainage, swelling or tenderness. A middle ear effusion is present. There is no impacted cerumen. No foreign body. No mastoid tenderness. No PE tube. No hemotympanum. Tympanic membrane is injected and bulging. Tympanic membrane is not scarred, perforated, erythematous or retracted.   Eyes:      General: Lids are normal. Lids are everted, no foreign bodies appreciated. Vision grossly intact. Gaze aligned appropriately.         Right eye: No foreign body, discharge or hordeolum.         Left eye: No foreign body, discharge or hordeolum.      Conjunctiva/sclera:      Right eye: Right conjunctiva is injected.   Cardiovascular:      Rate and Rhythm: Normal rate and regular rhythm.   Pulmonary:      Effort: Pulmonary effort is normal.      Breath sounds: Normal breath sounds.   Neurological:      Mental Status: She is alert and oriented to person, place, and time. Mental status is at baseline.       Administrative Statements

## 2024-08-12 NOTE — ASSESSMENT & PLAN NOTE
Not taking supplement, recommend restart vitamin D3 2000IU daily.       Ref Range & Units 3/23/24  9:56 AM 10/29/21  8:31 AM   Vit D, 25-Hydroxy  30.0 - 100.0 ng/mL 22.6 Low  22.5 Low

## 2024-09-13 ENCOUNTER — OFFICE VISIT (OUTPATIENT)
Dept: OBGYN CLINIC | Facility: CLINIC | Age: 39
End: 2024-09-13
Payer: COMMERCIAL

## 2024-09-13 VITALS
BODY MASS INDEX: 34.45 KG/M2 | HEIGHT: 62 IN | DIASTOLIC BLOOD PRESSURE: 78 MMHG | WEIGHT: 187.2 LBS | SYSTOLIC BLOOD PRESSURE: 120 MMHG | HEART RATE: 84 BPM

## 2024-09-13 DIAGNOSIS — Z12.4 ENCOUNTER FOR PAPANICOLAOU SMEAR FOR CERVICAL CANCER SCREENING: ICD-10-CM

## 2024-09-13 DIAGNOSIS — Z00.00 ANNUAL PHYSICAL EXAM: ICD-10-CM

## 2024-09-13 DIAGNOSIS — Z11.51 SCREENING FOR HPV (HUMAN PAPILLOMAVIRUS): ICD-10-CM

## 2024-09-13 DIAGNOSIS — Z01.419 ENCOUNTER FOR GYNECOLOGICAL EXAMINATION WITHOUT ABNORMAL FINDING: Primary | ICD-10-CM

## 2024-09-13 DIAGNOSIS — N92.0 MENORRHAGIA WITH REGULAR CYCLE: ICD-10-CM

## 2024-09-13 PROCEDURE — G0476 HPV COMBO ASSAY CA SCREEN: HCPCS | Performed by: NURSE PRACTITIONER

## 2024-09-13 PROCEDURE — S0610 ANNUAL GYNECOLOGICAL EXAMINA: HCPCS | Performed by: NURSE PRACTITIONER

## 2024-09-13 PROCEDURE — G0145 SCR C/V CYTO,THINLAYER,RESCR: HCPCS | Performed by: NURSE PRACTITIONER

## 2024-09-13 RX ORDER — FERROUS SULFATE 325(65) MG
325 TABLET ORAL
COMMUNITY

## 2024-09-13 NOTE — PROGRESS NOTES
Assessment / Plan    1. Encounter for gynecological examination without abnormal finding  Normal well woman exam.  Pap with hpv updated.  BC: condoms    2. Encounter for Papanicolaou smear for cervical cancer screening      3. Screening for HPV (human papillomavirus)      4. Menorrhagia with regular cycle  Complicated by anemia.  Check pelvic US.  RV to discuss results and treatment options.    - US pelvis complete w transvaginal; Future        Subjective      Claudette Huang is a 39 y.o. female who presents for her annual gynecologic exam.    NEW PATIENT  38 yo     Notes some stress incontinence.  She also have very heavy periods and anemia.  Mother had fibroids for which she had a hysterectomy.    Last pap: ~ 7 yrs ago, in [place]  Pap hx: normal  STD screening: --  STD hx: none  Sexually active: yes,   Condom use: yes  Nutrition: Body mass index is 34.24 kg/m².; given guidelines  Calcium: given guidelines  Exercise: stopped due to knee surgery; trying to restart; given guidelines  Safety: feels safe    Periods are regular but very heavy for 3 days.  Current contraception: condoms  History of abnormal Pap smear: no  Family history of breast,uterine, ovarian or colon cancer: no      Menstrual History:  OB History          3    Para   2    Term   2            AB   1    Living   2         SAB   1    IAB        Ectopic        Multiple        Live Births   2           Obstetric Comments   Menarche 14  Cycles 28d, x 8d,  heavy x 3d, large diaper pads, changes every hour to 1 1/2 hours; + clots; + cramps (manageable without medication).  Vaginal births x 2                Patient's last menstrual period was 2024 (exact date).       The following portions of the patient's history were reviewed and updated as appropriate: allergies, current medications, past family history, past medical history, past social history, past surgical history, and problem list.    Review of Systems      Review of  "Systems   Constitutional:  Negative for chills and fever.   Respiratory:  Negative for cough and shortness of breath.    Gastrointestinal:  Negative for abdominal distention, abdominal pain, blood in stool, constipation, diarrhea, nausea and vomiting.   Genitourinary:  Positive for menstrual problem (heavy). Negative for difficulty urinating, dysuria, frequency, genital sores, hematuria, pelvic pain, urgency, vaginal bleeding and vaginal discharge.        Stress urinary incontinence   Musculoskeletal:  Negative for arthralgias and myalgias.     Breasts:  Negative for skin changes, dimpling, asymmetry, nipple discharge, redness, tenderness or palpable masses    Objective     Chaperone present: yes     /78 (BP Location: Right arm, Patient Position: Sitting, Cuff Size: Adult)   Pulse 84   Ht 5' 2\" (1.575 m)   Wt 84.9 kg (187 lb 3.2 oz)   LMP 08/19/2024 (Exact Date)   BMI 34.24 kg/m²   Physical Exam  Constitutional:       General: She is not in acute distress.     Appearance: Normal appearance. She is well-developed. She is not ill-appearing or diaphoretic.      Comments: Body mass index is 34.24 kg/m².     HENT:      Head: Normocephalic and atraumatic.   Eyes:      Pupils: Pupils are equal, round, and reactive to light.   Neck:      Thyroid: No thyromegaly.   Pulmonary:      Effort: Pulmonary effort is normal.   Chest:   Breasts:     Breasts are symmetrical.      Right: No inverted nipple, mass, nipple discharge, skin change or tenderness.      Left: No inverted nipple, mass, nipple discharge, skin change or tenderness.   Abdominal:      General: There is no distension.      Palpations: Abdomen is soft. There is no mass.      Tenderness: There is no abdominal tenderness. There is no guarding or rebound.   Genitourinary:     General: Normal vulva.      Exam position: Lithotomy position.      Labia:         Right: No rash, tenderness, lesion or injury.         Left: No rash, tenderness, lesion or injury.       " Vagina: No signs of injury and foreign body. No vaginal discharge, erythema, tenderness or bleeding.      Cervix: No cervical motion tenderness, discharge or friability.      Uterus: Not enlarged and not tender.       Adnexa:         Right: No mass or tenderness.          Left: No mass or tenderness.     Musculoskeletal:      Cervical back: Neck supple.   Lymphadenopathy:      Cervical: No cervical adenopathy.      Upper Body:      Right upper body: No supraclavicular adenopathy.      Left upper body: No supraclavicular adenopathy.   Skin:     General: Skin is warm and dry.   Neurological:      General: No focal deficit present.      Mental Status: She is alert and oriented to person, place, and time.   Psychiatric:         Mood and Affect: Mood normal.         Behavior: Behavior normal.         Thought Content: Thought content normal.         Judgment: Judgment normal.

## 2024-09-13 NOTE — PATIENT INSTRUCTIONS
"Patient Education     Diet and health   The Basics   Written by the doctors and editors at [facility]   Why is it important to eat a healthy diet? -- It's important to eat a healthy diet because eating the right foods can keep you healthy now and later in life. It can lower the risk of problems like heart disease, diabetes, high blood pressure, and some types of cancer. It can also help you live longer and improve your quality of life.  What kind of diet is best? -- There is no 1 specific diet that experts recommend for everyone. People choose what foods to eat for many different reasons. These include personal preference, culture, Holiness, allergies or intolerances, and nutritional goals. People also need to consider the cost and availability of different foods.  In general, experts recommend a diet that:   Includes lots of vegetables, fruits, beans, nuts, and whole grains   Limits red and processed meats, unhealthy fats, sugar, salt, and alcohol  What are dietary patterns? -- A dietary \"pattern\" means generally eating certain types of foods while limiting others. Some people need to follow a specific dietary pattern because of their health needs. For example, if you have high blood pressure, your doctor might recommend a diet low in salt.  If you are trying to improve your health in general, choosing a healthy dietary pattern can help. This does not have to mean being very strict about what you eat or avoid. The goal is to think about getting plenty of healthy foods while limiting less healthy foods.  Examples of dietary patterns include:   Mediterranean diet - This involves eating a lot of fruits, vegetables, nuts, and whole grains, and uses olive oil instead of other fats. It also includes some fish, poultry, and dairy products, but not a lot of red meat. Following this diet can help your overall health, and might even lower your risk of having a stroke.   Plant-based diets - These patterns focus on vegetables, " "fruits, grains, beans, and nuts. They limit or avoid food that comes from animals, such as meat and dairy. There are different types of plant-based diets, including vegetarian and vegan.   Low-fat diet - A low-fat diet involves limiting calories from fat. This might help some people keep weight off if that is their goal, but it does not have many other health benefits. If you choose to follow a low-fat diet, it is also important to focus on getting lots of whole grains, legumes, fruits, and vegetables. Limit refined grains and sugar.   Low-cholesterol diet - Cholesterol is found in foods with a lot of saturated fat, like red meat, butter, and cheese. A low-cholesterol diet focuses on limiting the amount of cholesterol that you eat. Limiting the cholesterol in your diet can also help lower the amount of unhealthy fats that you eat.  Which foods are especially healthy? -- Foods that are especially healthy include:   Fruits and vegetables - Eating a diet with lots of fruits and vegetables can help prevent heart disease and stroke. It might also help prevent certain types of cancer. Try to eat fruits and vegetables at each meal and also for snacks. If you don't have fresh fruits and vegetables available, you can eat frozen or canned ones instead. Doctors recommend eating at least 5 servings of fruits or vegetables each day.   Whole grains - Whole-grain foods include 100 percent whole-wheat bread, steel cut oats, and whole-grain pasta. These are healthier than foods made with \"refined\" grains, like white bread and white rice. Eating lots of whole grains instead of refined grains has been shown to help with weight control. It can also lower the risk of several health problems, including colon cancer, heart disease, and diabetes. Doctors recommend that most people try to eat 5 to 8 servings of whole-grain, high-fiber foods each day.   Foods with fiber - Eating foods with a lot of fiber can help prevent heart disease and " "stroke. If you have type 2 diabetes, it can also help control your blood sugar. Foods that have a lot of fiber include vegetables, fruits, beans, nuts, oatmeal, and whole-grain breads and cereals. You can tell how much fiber is in a food by reading the nutrition label (figure 1). Doctors recommend that most people eat about 25 to 34 grams of fiber each day.   Foods with calcium and vitamin D - Babies, children, and adults need calcium and vitamin D to help keep their bones strong. Adults also need calcium and vitamin D to help prevent osteoporosis. Osteoporosis is a condition that causes bones to get \"thin\" and break more easily than usual. Different foods and drinks have calcium and vitamin D in them (figure 2). People who don't get enough calcium and vitamin D in their diet might need to take a supplement. Doctors recommend that most people have 2 to 3 servings of foods with calcium and vitamin D each day.   Foods with protein - Protein helps your muscles and bones stay strong. Healthy foods with a lot of protein include chicken, fish, eggs, beans, nuts, and soy products. Red meat also has a lot of protein, but it also contains fats, which can be unhealthy. Doctors recommend that most people try to eat about 5 servings of protein each day.   Healthy fats - There are different types of fats. Some types of fats are better for your body than others. Healthy fats are \"monounsaturated\" or \"polyunsaturated\" fats. These are found in fatty fish, nuts and nut butters, and avocados. Use plant-based oils when cooking. Examples of these oils include olive, canola, safflower, sunflower, and corn oil. Eating foods with healthy fats, while avoiding or limiting foods with unhealthy fats, might lower the risk of heart disease.   Foods with folate - Folate is a vitamin that is important for pregnant people, since it helps prevent certain birth defects. It is also called \"folic acid.\" Anyone who could get pregnant should get at " "least 400 micrograms of folic acid daily, whether or not they are actively trying to get pregnant. Folate is found in many breakfast cereals, oranges, orange juice, and green leafy vegetables.  What foods should I avoid or limit? -- To eat a healthy diet, there are some things that you should avoid or limit. They include:   Unhealthy fats - \"Trans\" fats are especially unhealthy. They are found in margarines, many fast foods, and some store-bought baked goods. \"Saturated\" fats are found in animal products like meats, egg yolks, butter, cheese, and full-fat milk products. Unhealthy fats can raise your cholesterol level and increase your chance of getting heart disease.   Sugar - To have a healthy diet, it's important to limit or avoid added sugar, sweets, and refined grains. Refined grains are found in white bread, white rice, most pastas, and most packaged \"snack\" foods.  Avoiding sugar-sweetened beverages, like soda and sports drinks, can also help improve your health.  Avoid canned fruits in \"heavy\" syrup.   Red and processed meats - Studies have shown that eating a lot of red meat can increase your risk of certain health problems, including heart disease and cancer. You should limit the amount of red meat that you eat. This is also true for processed meats like sausage, hot dogs, and fish.  Can I drink alcohol as part of a healthy diet? -- Not drinking alcohol at all is the healthiest choice. Regular drinking can raise a person's chances of getting liver disease and certain types of cancers. In females, even 1 drink a day can increase the risk of getting breast cancer.  If you do choose to drink, most doctors recommend limiting alcohol to no more than:   1 drink a day for females   2 drinks a day for males  The limits are different because, generally, the female body takes longer to break down alcohol.  How many calories do I need each day? -- Calories give your body energy. The number of calories that you need " "each day depends on your weight, height, age, sex, and how active you are.  Your doctor or nurse can tell you about how many calories you should eat each day. You can also work with a dietitian (nutrition expert) to learn more about your dietary needs and options.  What if I am having trouble improving my diet? -- It can be hard to change the way that you eat. Remember that even small changes can improve your health.  Here are some tips that might help:   Try to make fruits and vegetables part of every meal. If you don't have fresh fruits and vegetables, frozen or canned are good options. Look for products without added salt or sugar.   Keep a bowl of fruit out for snacking.   When you can, choose whole grains instead of refined grains. Choose chicken, fish, and beans instead of red meat and cheese.   Try to eat prepared and processed foods less often.   Try flavored seltzer or water instead of soda or juice.   When eating at fast food restaurants, look for healthier items, like broiled chicken or salad.  If you have questions about which foods you should or should not eat, ask your doctor, nurse, or dietitian. The right diet for you will depend, in part, on your health and any medical conditions you have.  All topics are updated as new evidence becomes available and our peer review process is complete.  This topic retrieved from [email or web address] on: Feb 28, 2024.  Topic 88776 Version 28.0  Release: 32.2.4 - C32.58  © 2024 UpToDate, Inc. and/or its affiliates. All rights reserved.  figure 1: Nutrition label - Fiber     This is an example of a nutrition label. To figure out how much fiber is in a food, look for the line that says \"Dietary Fiber.\" It's also important to look at the serving size. This food has 7 grams of fiber in each serving, and each serving is 1 cup.  Graphic 03738 Version 8.0  figure 2: Foods and drinks with calcium and vitamin D     Foods rich in calcium include ice cream, soy milk, breads, kale, " "broccoli, milk, cheese, cottage cheese, almonds, yogurt, ready-to-eat cereals, beans, and tofu. Foods rich in vitamin D include milk, fortified plant-based \"milks\" (soy, almond), canned tuna fish, cod liver oil, yogurt, ready-to-eat-cereals, cooked salmon, canned sardines, mackerel, and eggs. Some of these foods are rich in both.  Graphic 80119 Version 4.0  Consumer Information Use and Disclaimer   Disclaimer: This generalized information is a limited summary of diagnosis, treatment, and/or medication information. It is not meant to be comprehensive and should be used as a tool to help the user understand and/or assess potential diagnostic and treatment options. It does NOT include all information about conditions, treatments, medications, side effects, or risks that may apply to a specific patient. It is not intended to be medical advice or a substitute for the medical advice, diagnosis, or treatment of a health care provider based on the health care provider's examination and assessment of a patient's specific and unique circumstances. Patients must speak with a health care provider for complete information about their health, medical questions, and treatment options, including any risks or benefits regarding use of medications. This information does not endorse any treatments or medications as safe, effective, or approved for treating a specific patient. UpToDate, Inc. and its affiliates disclaim any warranty or liability relating to this information or the use thereof.The use of this information is governed by the Terms of Use, available at https://www.wolterskluwer.com/en/know/clinical-effectiveness-terms. 2024© UpToDate, Inc. and its affiliates and/or licensors. All rights reserved.  Copyright   © 2024 UpToDate, Inc. and/or its affiliates. All rights reserved.  Patient Education     Calcium Rich Diet   About this topic   Calcium is one of the most important minerals and is found in almost all parts of your " body. It makes your teeth and bones strong and healthy. Your body does not make calcium. It is important for you to eat calcium rich foods to get enough calcium. It also helps your body:  Make blood clots  Keep your heartbeat normal  Helps blood move throughout the body  Release hormones  Release enzymes  Send and get signals between your nerves and brain  Make muscles work well         What will the results be?   It is important to have a good amount of calcium in your food. Calcium helps your body work well. It is very important during every life stage. Calcium may also keep you from losing bone mass. This is osteopenia. It may help keep you from having weak bones. This is osteoporosis.  What drugs may be needed?   The doctor may order calcium and vitamin D supplements. You need vitamin D to help your body take in the calcium. Your calcium supplement may have vitamin D in it.  Talk to your doctor about:  If it is OK to take your calcium with food.  How often to take your calcium supplement throughout the day.  All the drugs you are taking. Be sure to include all prescription and over-the-counter (OTC) drugs, and herbal supplements. Tell the doctor about any drug allergy. Bring a list of drugs you take with you. Some drugs may cause problems with how your body takes in calcium.  Will physical activity be limited?   No, physical activities will not be limited. It is good for you to do light exercises and to stay active.  What changes to diet are needed?   You will need to watch how much calcium is in the foods you eat. Your doctor will talk to you about the right amount of calcium for you. How much calcium you need is based on your age and life stage.  When is this diet used?   This diet is used when your normal diet is low in calcium. It is needed to raise the amount of calcium in your diet. Our bodies do not take in calcium well as we get older.  Who should not use this diet?   People with too much calcium in  their blood should not use this diet. This is called hypercalcemia.  What foods are good to eat?   Milk, yogurt, and cheese products are naturally high in calcium.  These foods have smaller amounts of calcium. If they are eaten often and in large amounts they can be good sources of calcium:  Oysters; dried fruit like figs and raisins; green leafy vegetables like broccoli, collards, kale, mustard greens, bok mo; soy beans; oranges  [place] and sardines. Be sure to eat the soft bones.  Nuts like almonds and [place] nuts, sunflower seeds, tahini, dried beans  Food products with calcium added to them like orange juice, tofu, cereal, bread; almond milk, rice milk, soy milk  What foods should be limited or avoided?   People who eat many kinds of foods do not have to be worried about limiting or avoiding certain foods.   What problems could happen?   Some people may get kidney stones. This may happen after taking high amounts of calcium over a long time. Calcium from food does not seem to cause kidney stones.  Hard stools.  Too much calcium may interfere with your body’s ability to absorb iron and zinc.  When do I need to call the doctor?   Call your doctor if you have concerns about your diet. Tell your doctor if you are allergic to any of the foods in your diet.  Helpful tips   If you have problems digesting milk, try lactose-free milk. You may also use a product to help you take in lactose.  Talk with your doctor if you are a vegetarian and do not eat dairy products. Your doctor can help you make sure you get the amount of calcium your body needs.  Last Reviewed Date   2021-03-12  Consumer Information Use and Disclaimer   This generalized information is a limited summary of diagnosis, treatment, and/or medication information. It is not meant to be comprehensive and should be used as a tool to help the user understand and/or assess potential diagnostic and treatment options. It does NOT include all information about  conditions, treatments, medications, side effects, or risks that may apply to a specific patient. It is not intended to be medical advice or a substitute for the medical advice, diagnosis, or treatment of a health care provider based on the health care provider's examination and assessment of a patient’s specific and unique circumstances. Patients must speak with a health care provider for complete information about their health, medical questions, and treatment options, including any risks or benefits regarding use of medications. This information does not endorse any treatments or medications as safe, effective, or approved for treating a specific patient. UpToDate, Inc. and its affiliates disclaim any warranty or liability relating to this information or the use thereof. The use of this information is governed by the Terms of Use, available at https://www.[employer].com/en/know/clinical-effectiveness-terms   Copyright   Copyright © 2024 UpToDate, Inc. and its affiliates and/or licensors. All rights reserved.  Patient Education     Exercise and movement   The Basics   Written by the doctors and editors at [employer]   What are the benefits of movement? -- Moving your body has many benefits. It can:   Burn calories, which helps people manage their weight   Help control blood sugar levels in people with diabetes   Lower blood pressure, especially in people with high blood pressure   Lower stress, and help with depression and anxiety   Keep bones strong, so they don't get thin and break easily   Lower the chance of dying from heart disease  Adding even small amounts of physical activity to your daily routine can improve your health.  What are the main types of exercise? -- There are 3 main types of exercise:   Aerobic exercise - This raises your heart rate. Examples include walking, running, dancing, riding a bike, and swimming.   Muscle strengthening - This helps make your muscles stronger. You can do it using  weights, exercise bands, or weight machines. You can also use your own body weight, as with push-ups, or by lifting items in your home, like jugs of water.   Stretching - These help your muscles and joints move more easily.  It's important to have all 3 types in your exercise program. That way, your body, muscles, and joints can be as healthy as possible.  Should I talk to my doctor or nurse before I start exercising? -- If you have not exercised before or have not exercised in a long time, talk with your doctor or nurse before you start a very active exercise program.  If you have heart disease or risk factors for heart disease (like high blood pressure or diabetes), your doctor or nurse might recommend that you have an exercise test before starting an exercise program.  When you begin an exercise program, start slowly. For example, do the exercise at a slow pace or for a few minutes only. Over time, you can exercise faster and for longer periods of time.  What should I do when I exercise? -- Each time you exercise, you should:   Warm up - This can help keep you from hurting your muscles when you exercise. To warm up, do a light aerobic exercise (such as walking slowly) or stretch for 5 to 10 minutes.   Work out - Try to get a mix of aerobic exercise, muscle strengthening, and stretching. During an aerobic workout, you can walk fast, swim, run, or use an exercise machine, for example. Other activities, like dancing or playing tennis, are also forms of aerobic exercise. You should also take time to stretch all of your joints, including your neck, shoulders, back, hips, and knees. At least 2 times a week, you can do muscle strengthening exercises as part of your workout.   Cool down - This helps keep you from feeling dizzy after you exercise and helps prevent muscle cramps. To cool down, you can stretch or do a light aerobic exercise for 5 minutes.  Some people go to a gym or do group exercise classes. But you can  exercise even without these things. Some exercises can be done even in a small space. You can also try online videos or smartphone apps to get ideas for different types of exercise.  How often should I exercise? -- Doctors recommend that people exercise at least 30 minutes a day, on 5 or more days of the week.  If you can't exercise for 30 minutes straight, try to exercise for 10 minutes at a time, 3 or 4 times a day. Even exercising for shorter amounts of time is good for you, especially if it means spending less time sitting.  When should I call my doctor or nurse? -- If you have any of the following symptoms when you exercise, stop exercising and call your doctor or nurse right away:   Pain or pressure in your chest, arms, throat, jaw, or back   Nausea or vomiting   Feeling like your heart is fluttering or racing very fast   Feeling dizzy or faint  What if I don't have time to exercise? -- Many people have very busy lives and might not think that they have time to exercise. But it's important to try to find time, even if you are tired or work a lot. Exercise can increase your energy level, which can make you feel better and might even help you get more work done.  Even if it's hard to set aside a lot of time to exercise, you can still improve your health by moving your body more. There are many ways that you can be more active. For example, you can:   Take the stairs instead of the elevator.   Park in a parking space that is farther away from the door.   Take a longer route when you walk from one place to another.  Spending a lot of time sitting still (for example, watching TV or working on the computer) is bad for your health. Try to get up and move around whenever you can. Even small amounts of movement, like taking short walks, doing household chores, or gardening, can improve your health. Finding activities that you enjoy, or doing them with other people, can help you add more movement into your daily  life.  What else should I do when I exercise? -- To exercise safely and avoid problems, it's important to:   Drink fluids during and after exercising (but avoid drinks with a lot of caffeine or sugar).   Avoid exercising outside if it is too hot or cold.   Wear layers of clothes, so that you can take them off if you get too hot.   Wear shoes that fit well and support your feet.   Be aware of your surroundings if you exercise outside.  All topics are updated as new evidence becomes available and our peer review process is complete.  This topic retrieved from [employer] on: May 18, 2024.  Topic 55969 Version 31.0  Release: 32.4.3 - C32.137  © 2024 UpToDate, Inc. and/or its affiliates. All rights reserved.  Consumer Information Use and Disclaimer   Disclaimer: This generalized information is a limited summary of diagnosis, treatment, and/or medication information. It is not meant to be comprehensive and should be used as a tool to help the user understand and/or assess potential diagnostic and treatment options. It does NOT include all information about conditions, treatments, medications, side effects, or risks that may apply to a specific patient. It is not intended to be medical advice or a substitute for the medical advice, diagnosis, or treatment of a health care provider based on the health care provider's examination and assessment of a patient's specific and unique circumstances. Patients must speak with a health care provider for complete information about their health, medical questions, and treatment options, including any risks or benefits regarding use of medications. This information does not endorse any treatments or medications as safe, effective, or approved for treating a specific patient. UpToDate, Inc. and its affiliates disclaim any warranty or liability relating to this information or the use thereof.The use of this information is governed by the Terms of Use, available at  "https://www.woltersContractor Copilotuwer.com/en/know/clinical-effectiveness-terms. 2024© UpToDate, Inc. and its affiliates and/or licensors. All rights reserved.  Copyright   © 2024 UpToDate, Inc. and/or its affiliates. All rights reserved.      FOR MANAGEMENT OF OVERACTIVE BLADDER SYMPTOMS:    Eliminate bladder irritants:  caffeine, carbonated, sugar substitutes (except splenda), citrus fruits, tomatoes, alcohol & spicy foods.    Kegel exercises:  Squeeze the pelvic floor muscle and hold for 3 seconds, then relax the muscle for 3 seconds.  Repeat this ten times (this is one set).  Perform 3 to 4 sets per day.      Patient Education     Pelvic floor muscle exercises   The Basics   Written by the doctors and editors at [facility]   What is the pelvic floor? -- The \"pelvic floor\" is the name for the muscles that support the organs in the pelvis. These organs include the bladder and rectum. In the female pelvis, they also include the uterus (figure 1).  What are pelvic floor muscle exercises? -- These are exercises that can make your pelvic floor muscles stronger. They involve learning ways to tighten and relax specific muscles.  Pelvic floor muscle exercises can help keep you from leaking urine, gas, or bowel movements. They can also help with a condition called \"pelvic organ prolapse.\" This is when the organs in the lower belly drop down and press against or bulge into the vagina.  One way to strengthen your pelvic floor muscles is to do exercises. These are also known as \"Kegel\" exercises.  How do I do pelvic floor muscle exercises? -- If you want to try pelvic floor muscle exercises, start by talking to your doctor or nurse. They can talk to you about whether these exercises can help you. They can also teach you how to do them correctly.  You will need to learn which muscles to tighten and relax. It is sometimes hard to figure out the right muscles.  Some ways you can practice:   People with female or male anatomy - Squeeze the " "muscles you would use to avoid passing gas.   People with female anatomy - Put a finger inside your vagina and squeeze the muscles around your finger. Or you can imagine that you are sitting on a marble and have to pick it up using your vagina.   People with male anatomy - Squeeze the muscles that control the flow of urine. These exercises might help reduce urine leaks in people who have had surgery to treat prostate cancer or an enlarged prostate.  No matter how you learn to do pelvic floor muscle exercises, know that the muscles involved are not in your belly, thighs, or buttocks.  After you learn which muscles to tighten and relax, you can do the exercises in any position (standing, sitting, or lying down).  Should I see a physical therapist? -- Your doctor or nurse might suggest working with a physical therapist who has special training in pelvic floor issues. They can check your muscle strength and teach you specific exercises.  How often should I do the exercises? -- A common approach is to try to do a set of the exercises 3 times a day.  For each set, do the following about 10 times:   Squeeze your pelvic muscles.   Hold the muscles tight for about 10 seconds.   Relax the muscles completely.  Keep up this routine for at least a few months. You will probably notice results, but it might take a few weeks to several months.  How do pelvic floor muscle exercises help? -- Pelvic floor muscle exercises can help:   Prevent urine leaks in people who have \"stress incontinence\" - This means that they leak urine when they cough, laugh, sneeze, or strain.   Control sudden urges to urinate - These happen to people with \"urinary urgency\" or \"urge incontinence.\"   Control the release of gas or bowel movements   Improve symptoms caused by pelvic organ prolapse - These can include pressure or bulging in the vagina. If you have these symptoms, see your doctor or nurse to find out the cause.  It might take a few months of doing " "the exercises regularly before you notice them working. If you have been doing pelvic floor muscle exercises for several months and they don't seem to be making a difference, tell your doctor or nurse. They might suggest seeing a physical therapist or trying other treatments for your condition.  All topics are updated as new evidence becomes available and our peer review process is complete.  This topic retrieved from [employer] on: Feb 26, 2024.  Topic 97432 Version 15.0  Release: 32.2.4 - C32.56  © 2024 [employer] Inc[employer] and/or its affiliates. All rights reserved.  figure 1: Pelvic floor muscles     The \"pelvic floor\" is a group of muscles that support the organs in the pelvis. In females, these organs include the uterus, bladder, and rectum.  Graphic 525606 Version 2.0  Consumer Information Use and Disclaimer   Disclaimer: This generalized information is a limited summary of diagnosis, treatment, and/or medication information. It is not meant to be comprehensive and should be used as a tool to help the user understand and/or assess potential diagnostic and treatment options. It does NOT include all information about conditions, treatments, medications, side effects, or risks that may apply to a specific patient. It is not intended to be medical advice or a substitute for the medical advice, diagnosis, or treatment of a health care provider based on the health care provider's examination and assessment of a patient's specific and unique circumstances. Patients must speak with a health care provider for complete information about their health, medical questions, and treatment options, including any risks or benefits regarding use of medications. This information does not endorse any treatments or medications as safe, effective, or approved for treating a specific patient. Up[employer] Inc[employer] and its affiliates disclaim any warranty or liability relating to this information or the use thereof.The use of this information is " governed by the Terms of Use, available at https://www.wolters[employer]Triguwer.com/en/know/clinical-effectiveness-terms. 2024© [employer] and its affiliates and/or licensors. All rights reserved.  Copyright   © 2024 [employer] and/or its affiliates. All rights reserved.

## 2024-09-15 ENCOUNTER — HOSPITAL ENCOUNTER (OUTPATIENT)
Dept: ULTRASOUND IMAGING | Facility: HOSPITAL | Age: 39
Discharge: HOME | End: 2024-09-15
Payer: COMMERCIAL

## 2024-09-15 DIAGNOSIS — N92.0 MENORRHAGIA WITH REGULAR CYCLE: ICD-10-CM

## 2024-09-15 PROCEDURE — 76830 TRANSVAGINAL US NON-OB: CPT

## 2024-09-15 PROCEDURE — 76856 US EXAM PELVIC COMPLETE: CPT

## 2024-09-16 LAB
HPV HR 12 DNA CVX QL NAA+PROBE: NEGATIVE
HPV16 DNA CVX QL NAA+PROBE: NEGATIVE
HPV18 DNA CVX QL NAA+PROBE: NEGATIVE

## 2024-09-17 NOTE — RESULT ENCOUNTER NOTE
40 yo  with menorrhagia / regular cycles, hx anemia:  Pelvic US:  Uterus 10.6 x 5.3 x 7.0 cm, normal size/shape and contour.  Endometrial lining 11 mm.  Ovaries normal shape and size without cysts/masses.  Normal.  Patient has visit scheduled with me in October to discuss results/management.

## 2024-09-18 LAB
LAB AP GYN PRIMARY INTERPRETATION: NORMAL
Lab: NORMAL

## 2024-10-25 ENCOUNTER — OFFICE VISIT (OUTPATIENT)
Dept: OBGYN CLINIC | Facility: CLINIC | Age: 39
End: 2024-10-25
Payer: COMMERCIAL

## 2024-10-25 VITALS
WEIGHT: 190.8 LBS | OXYGEN SATURATION: 100 % | HEIGHT: 62 IN | SYSTOLIC BLOOD PRESSURE: 120 MMHG | DIASTOLIC BLOOD PRESSURE: 82 MMHG | BODY MASS INDEX: 35.11 KG/M2 | HEART RATE: 72 BPM

## 2024-10-25 DIAGNOSIS — N92.0 MENORRHAGIA WITH REGULAR CYCLE: Primary | ICD-10-CM

## 2024-10-25 PROCEDURE — 99214 OFFICE O/P EST MOD 30 MIN: CPT | Performed by: NURSE PRACTITIONER

## 2024-10-25 RX ORDER — TRANEXAMIC ACID 650 MG/1
1300 TABLET ORAL 3 TIMES DAILY
Qty: 30 TABLET | Refills: 11 | Status: SHIPPED | OUTPATIENT
Start: 2024-10-25 | End: 2024-10-30

## 2024-10-25 NOTE — PROGRESS NOTES
Assessment / Plan    Assessment & Plan  Menorrhagia with regular cycle    Pelvic US results, which were normal, were reviewed in detail with patient.  Discussed risks/ benefits of each of her treatment options and she is a candidate for all of them:  TXA, CARMELO or POP, LN IUD, EMA  Patient is not interested in surgical management and at this time would like to avoid hormones due to effect on weight.  Will try TXA to start.  Rx sent to her pharmacy, instructed on its use.  RV prn if not satisfactorily managed.      Orders:    TSH, 3rd generation with Free T4 reflex; Future    Tranexamic Acid 650 MG TABS; Take 2 tablets (1,300 mg total) by mouth 3 (three) times a day for 5 days        I have spent a total time of 30 minutes in caring for this patient on the day of the visit/encounter including Diagnostic results, Risks and benefits of tx options, Instructions for management, Patient and family education, Impressions, Counseling / Coordination of care, Documenting in the medical record, Reviewing / ordering tests, medicine, procedures  , and Obtaining or reviewing history  .      Subjective   PROBLEM VISIT     Claudette Huang is a 39 y.o. female     40 yo   Seen for yearly recently at which time she reported very heavy periods complicated by symptomatic anemia.    Periods Q 28d, duration of 8d; 3 days very heavy, using large diaper pads, changing every 1- 1 1/2 hrs, + clots (sometimes quarter sized) and cramps (sometimes severe, 9/10), uses ibuprofen 400 mg approx twice daily (cuts the pain but still present).      I ordered a pelvic US which showed the followin/15/24 Pelvic US:  Uterus 10.6 x 5.3 x 7.0 cm, normal size/shape and contour.  Endometrial lining 11 mm.  Ovaries normal shape and size without cysts/masses.  Normal.    Onset:  periods have been heavy and painful since menarche, age 14  alleviating factors: ibuprofen/ hot bottle cuts the pain.    Treatments:  Taking an iron supplement  Ibuprofen 400 mg  twice daily when severe dysmenorrhea  Hx of OCP use ~5 y ago to manage heavy period, which was effective however she stopped it due to losing insurance and did not resume due to concern with weight gain.    Pertinent history:  BC: rhythm method and condoms  Patient's medical history is negative for liver, gallbladder or thromboembolic disease.  Hx of migraine headaches, without aura.  Non-tobacco user.  BP is normal.    Pertinent labs:  3/2024 CBC hgb 9.4, hct 31.9  3/2024 ferritin low at 2; iron low at 15.  Was advised to take oral iron   10/2021 TSH 4.3    Menstrual History:  OB History          3    Para   2    Term   2            AB   1    Living   2         SAB   1    IAB        Ectopic        Multiple        Live Births   2           Obstetric Comments   Menarche 14  Cycles 28d, x 8d,  heavy x 3d, large diaper pads, changes every hour to 1 1/2 hours; + clots; + cramps (manageable without medication).  Vaginal births x 2                Patient's last menstrual period was 10/13/2024 (exact date).       The following portions of the patient's history were reviewed and updated as appropriate: allergies, current medications, past family history, past medical history, past social history, past surgical history, and problem list.    Review of Systems      Review of Systems   Constitutional:  Positive for fatigue. Negative for chills and fever.   Respiratory:  Negative for shortness of breath.    Cardiovascular:  Negative for chest pain.   Gastrointestinal:  Negative for constipation (sometimes w/ menses) and diarrhea.   Genitourinary:  Positive for menstrual problem (heavy and painful). Negative for difficulty urinating, dysuria, frequency, genital sores, pelvic pain, urgency, vaginal bleeding and vaginal discharge.   Neurological:  Positive for dizziness (when stands too quickly), light-headedness and headaches (occasional). Negative for syncope.     Breasts:  Negative for skin changes, dimpling,  "asymmetry, nipple discharge, redness, tenderness or palpable masses    Objective     Visit Vitals  /82 (BP Location: Right arm, Patient Position: Sitting, Cuff Size: Adult)   Pulse 72   Ht 5' 2\" (1.575 m)   Wt 86.5 kg (190 lb 12.8 oz)   LMP 10/13/2024 (Exact Date)   SpO2 100%   BMI 34.90 kg/m²   OB Status Having periods   Smoking Status Never   BSA 1.87 m²      Chaperone present: n/a     /82 (BP Location: Right arm, Patient Position: Sitting, Cuff Size: Adult)   Pulse 72   Ht 5' 2\" (1.575 m)   Wt 86.5 kg (190 lb 12.8 oz)   LMP 10/13/2024 (Exact Date)   SpO2 100%   BMI 34.90 kg/m²   Physical Exam  Constitutional:       General: She is not in acute distress.     Appearance: Normal appearance. She is not ill-appearing or diaphoretic.      Comments: Body mass index is 34.9 kg/m².     HENT:      Head: Normocephalic and atraumatic.   Eyes:      Pupils: Pupils are equal, round, and reactive to light.   Pulmonary:      Effort: Pulmonary effort is normal.   Skin:     General: Skin is warm and dry.   Neurological:      General: No focal deficit present.      Mental Status: She is alert and oriented to person, place, and time.   Psychiatric:         Mood and Affect: Mood normal.         Behavior: Behavior normal.         Thought Content: Thought content normal.         Judgment: Judgment normal.               "

## (undated) DEVICE — SUT VICRYL 2-0 CT-1 36 IN J945H

## (undated) DEVICE — COBAN 4 IN STERILE

## (undated) DEVICE — EXTREMITY DRAPE W/ARMBOARD COVERS: Brand: CONVERTORS

## (undated) DEVICE — NEPTUNE E-SEP SMOKE EVACUATION PENCIL, COATED, 70MM BLADE, PUSH BUTTON SWITCH: Brand: NEPTUNE E-SEP

## (undated) DEVICE — SCD SEQUENTIAL COMPRESSION COMFORT SLEEVE MEDIUM KNEE LENGTH: Brand: KENDALL SCD

## (undated) DEVICE — STIRRUP STRAP ADULT DISP

## (undated) DEVICE — STOCKINETTE,IMPERVIOUS,12X48,STERILE: Brand: MEDLINE

## (undated) DEVICE — INTENDED FOR TISSUE SEPARATION, AND OTHER PROCEDURES THAT REQUIRE A SHARP SURGICAL BLADE TO PUNCTURE OR CUT.: Brand: BARD-PARKER SAFETY BLADES SIZE 15, STERILE

## (undated) DEVICE — VAPR COOLPULSE 90 ELECTRODE 90 DEGREES SUCTION WITH INTEGRATED HANDPIECE: Brand: VAPR COOLPULSE

## (undated) DEVICE — 3M™ STERI-DRAPE™ U-DRAPE 1015: Brand: STERI-DRAPE™

## (undated) DEVICE — ARTHROSCOPY FLOOR MAT

## (undated) DEVICE — Device

## (undated) DEVICE — TUBING ARTHROSCOPIC WAVE  MAIN PUMP

## (undated) DEVICE — VAPR HOOK ELECTRODE 3.5MM HOOK ELECTRODE WITH INTEGRATED HANDPIECE: Brand: VAPR

## (undated) DEVICE — PADDING CAST 6IN COTTON STRL

## (undated) DEVICE — BOWL: 16OZ PEELPOUCH 75/CS 16/PLT: Brand: MEDEGEN MEDICAL PRODUCTS, LLC

## (undated) DEVICE — DRAPE SHEET THREE QUARTER

## (undated) DEVICE — SYRINGE 30ML LL

## (undated) DEVICE — NEEDLE BLUNT 18 G X 1 1/2IN

## (undated) DEVICE — ABDOMINAL PAD: Brand: DERMACEA

## (undated) DEVICE — MEDI-VAC YANK SUCT HNDL W/TPRD BULBOUS TIP: Brand: CARDINAL HEALTH

## (undated) DEVICE — GLOVE INDICATOR PI UNDERGLOVE SZ 7 BLUE

## (undated) DEVICE — BETHLEHEM UNIVERSAL  ARTHRO PK: Brand: CARDINAL HEALTH

## (undated) DEVICE — CHLORAPREP HI-LITE 26ML ORANGE

## (undated) DEVICE — 4-PORT MANIFOLD: Brand: NEPTUNE 2

## (undated) DEVICE — VIOLET BRAIDED (POLYGLACTIN 910), SYNTHETIC ABSORBABLE SUTURE: Brand: COATED VICRYL

## (undated) DEVICE — OCCLUSIVE GAUZE STRIP,3% BISMUTH TRIBROMOPHENATE IN PETROLATUM BLEND: Brand: XEROFORM

## (undated) DEVICE — DISPOSABLE OR TOWEL: Brand: CARDINAL HEALTH

## (undated) DEVICE — ANTI-FOG SOLUTION WITH FOAM PAD: Brand: DEVON

## (undated) DEVICE — SUT MONOCRYL 4-0 PS-2 27 IN Y426H

## (undated) DEVICE — STANDARD SURGICAL GOWN, L: Brand: CONVERTORS

## (undated) DEVICE — WEBRIL 6 IN UNSTERILE

## (undated) DEVICE — 3M™ STERI-STRIP™ REINFORCED ADHESIVE SKIN CLOSURES, R1547, 1/2 IN X 4 IN (12 MM X 100 MM), 6 STRIPS/ENVELOPE: Brand: 3M™ STERI-STRIP™

## (undated) DEVICE — GUIDE PIN 1.1MM NITINOL BLUNT

## (undated) DEVICE — GLOVE SRG LF STRL BGL SKNSNS 8.5 PF

## (undated) DEVICE — TUBING SUCTION 5MM X 12 FT

## (undated) DEVICE — ACE WRAP 6 IN UNSTERILE

## (undated) DEVICE — GLOVE SRG LF STRL BGL SKNSNS 6.5 PF

## (undated) DEVICE — GUIDE PIN 2.4 X 435MM W/SUT EYE

## (undated) DEVICE — 3M™ IOBAN™ 2 ANTIMICROBIAL INCISE DRAPE 6650EZ: Brand: IOBAN™ 2

## (undated) DEVICE — STERILE POLYISOPRENE POWDER-FREE SURGICAL GLOVES WITH EMOLLIENT COATING: Brand: PROTEXIS

## (undated) DEVICE — SUT VICRYL 2-0 SH 27 IN UNDYED J417H

## (undated) DEVICE — GAUZE SPONGES,16 PLY: Brand: CURITY

## (undated) DEVICE — KIT DISP 3MM PLLA SHOULDER

## (undated) DEVICE — LIGHT GLOVE GREEN

## (undated) DEVICE — INTENT TO BE USED WITH SUTURE MATERIAL FOR TISSUE CLOSURE: Brand: RICHARD-ALLAN® NEEDLE 1/2 CIRCLE TAPER

## (undated) DEVICE — SPONGE LAP 18 X 18 IN STRL RFD

## (undated) DEVICE — INTENDED FOR TISSUE SEPARATION, AND OTHER PROCEDURES THAT REQUIRE A SHARP SURGICAL BLADE TO PUNCTURE OR CUT.: Brand: BARD-PARKER ® SAFETYLOCK CARBON RIB-BACK BLADES

## (undated) DEVICE — SUTURETAPE FIBERLOOP W/NDL WHITE/BL AR-7534

## (undated) DEVICE — CUFF TOURNIQUET 30 X 4 IN QUICK CONNECT DISP 1BLA

## (undated) DEVICE — SUT VICRYL 0 CT-1 CR/8 27 IN JJ41G